# Patient Record
Sex: FEMALE | Race: OTHER | HISPANIC OR LATINO | Employment: FULL TIME | URBAN - METROPOLITAN AREA
[De-identification: names, ages, dates, MRNs, and addresses within clinical notes are randomized per-mention and may not be internally consistent; named-entity substitution may affect disease eponyms.]

---

## 2019-09-24 ENCOUNTER — HOSPITAL ENCOUNTER (OUTPATIENT)
Dept: ULTRASOUND IMAGING | Age: 34
Discharge: HOME | End: 2019-09-24
Attending: FAMILY MEDICINE
Payer: COMMERCIAL

## 2019-09-24 DIAGNOSIS — R10.9 FLANK PAIN: ICD-10-CM

## 2019-09-24 PROCEDURE — 76700 US EXAM ABDOM COMPLETE: CPT

## 2019-11-11 NOTE — PROGRESS NOTES
Interpreting services have been requested for procedure on 11/12/19. [facility]. Contact phone number is [phone]. Jona Foote [facility] Chano Palumbo  Patient [email or web address] Services  p: [phone] / Vinicius Merrill Do [facility] [address] / [place], [address] [facility]  [email or web address]. [facility]

## 2019-11-12 ENCOUNTER — HOSPITAL ENCOUNTER (OUTPATIENT)
Age: 34
Setting detail: OUTPATIENT SURGERY
Discharge: HOME | End: 2019-11-12
Attending: UROLOGY | Admitting: UROLOGY
Payer: COMMERCIAL

## 2019-11-12 ENCOUNTER — ANESTHESIA EVENT (OUTPATIENT)
Dept: SURGERY | Age: 34
End: 2019-11-12
Payer: COMMERCIAL

## 2019-11-12 ENCOUNTER — ANESTHESIA (OUTPATIENT)
Dept: SURGERY | Age: 34
End: 2019-11-12
Payer: COMMERCIAL

## 2019-11-12 VITALS
OXYGEN SATURATION: 96 % | WEIGHT: 140 LBS | HEART RATE: 106 BPM | DIASTOLIC BLOOD PRESSURE: 75 MMHG | TEMPERATURE: 98 F | BODY MASS INDEX: 26.45 KG/M2 | RESPIRATION RATE: 18 BRPM | SYSTOLIC BLOOD PRESSURE: 115 MMHG

## 2019-11-12 DIAGNOSIS — N20.0 RIGHT KIDNEY STONE: Primary | ICD-10-CM

## 2019-11-12 LAB — HCG UR QL: NEGATIVE

## 2019-11-12 PROCEDURE — 81025 URINE PREGNANCY TEST: CPT

## 2019-11-12 PROCEDURE — 74011250636 HC RX REV CODE- 250/636: Performed by: REGISTERED NURSE

## 2019-11-12 PROCEDURE — 76210000063 HC OR PH I REC FIRST 0.5 HR: Performed by: UROLOGY

## 2019-11-12 PROCEDURE — 50590 FRAGMENTING OF KIDNEY STONE: CPT | Performed by: UROLOGY

## 2019-11-12 PROCEDURE — 77030010509 HC AIRWY LMA MSK TELE -A: Performed by: ANESTHESIOLOGY

## 2019-11-12 PROCEDURE — 76060000033 HC ANESTHESIA 1 TO 1.5 HR: Performed by: UROLOGY

## 2019-11-12 PROCEDURE — 74011000250 HC RX REV CODE- 250: Performed by: REGISTERED NURSE

## 2019-11-12 PROCEDURE — 74011250636 HC RX REV CODE- 250/636: Performed by: ANESTHESIOLOGY

## 2019-11-12 PROCEDURE — 74011250637 HC RX REV CODE- 250/637: Performed by: ANESTHESIOLOGY

## 2019-11-12 PROCEDURE — 77030040361 HC SLV COMPR DVT MDII -B: Performed by: UROLOGY

## 2019-11-12 PROCEDURE — 76010000138 HC OR TIME 0.5 TO 1 HR: Performed by: UROLOGY

## 2019-11-12 PROCEDURE — 74011250636 HC RX REV CODE- 250/636: Performed by: UROLOGY

## 2019-11-12 PROCEDURE — 76210000020 HC REC RM PH II FIRST 0.5 HR: Performed by: UROLOGY

## 2019-11-12 RX ORDER — ONDANSETRON 2 MG/ML
INJECTION INTRAMUSCULAR; INTRAVENOUS AS NEEDED
Status: DISCONTINUED | OUTPATIENT
Start: 2019-11-12 | End: 2019-11-12 | Stop reason: HOSPADM

## 2019-11-12 RX ORDER — FENTANYL CITRATE 50 UG/ML
INJECTION, SOLUTION INTRAMUSCULAR; INTRAVENOUS AS NEEDED
Status: DISCONTINUED | OUTPATIENT
Start: 2019-11-12 | End: 2019-11-12 | Stop reason: HOSPADM

## 2019-11-12 RX ORDER — SODIUM CHLORIDE 0.9 % (FLUSH) 0.9 %
5-40 SYRINGE (ML) INJECTION EVERY 8 HOURS
Status: DISCONTINUED | OUTPATIENT
Start: 2019-11-12 | End: 2019-11-12 | Stop reason: HOSPADM

## 2019-11-12 RX ORDER — OXYCODONE HYDROCHLORIDE 5 MG/1
5 TABLET ORAL
Status: DISCONTINUED | OUTPATIENT
Start: 2019-11-12 | End: 2019-11-12 | Stop reason: HOSPADM

## 2019-11-12 RX ORDER — MIDAZOLAM HYDROCHLORIDE 1 MG/ML
2 INJECTION, SOLUTION INTRAMUSCULAR; INTRAVENOUS
Status: COMPLETED | OUTPATIENT
Start: 2019-11-12 | End: 2019-11-12

## 2019-11-12 RX ORDER — SODIUM CHLORIDE 0.9 % (FLUSH) 0.9 %
5-40 SYRINGE (ML) INJECTION AS NEEDED
Status: DISCONTINUED | OUTPATIENT
Start: 2019-11-12 | End: 2019-11-12 | Stop reason: HOSPADM

## 2019-11-12 RX ORDER — HYDROCODONE BITARTRATE AND ACETAMINOPHEN 5; 325 MG/1; MG/1
1 TABLET ORAL
Qty: 20 TAB | Refills: 0 | Status: SHIPPED | OUTPATIENT
Start: 2019-11-12 | End: 2019-11-19

## 2019-11-12 RX ORDER — SODIUM CHLORIDE, SODIUM LACTATE, POTASSIUM CHLORIDE, CALCIUM CHLORIDE 600; 310; 30; 20 MG/100ML; MG/100ML; MG/100ML; MG/100ML
75 INJECTION, SOLUTION INTRAVENOUS CONTINUOUS
Status: DISCONTINUED | OUTPATIENT
Start: 2019-11-12 | End: 2019-11-12 | Stop reason: HOSPADM

## 2019-11-12 RX ORDER — EPHEDRINE SULFATE/0.9% NACL/PF 50 MG/5 ML
SYRINGE (ML) INTRAVENOUS AS NEEDED
Status: DISCONTINUED | OUTPATIENT
Start: 2019-11-12 | End: 2019-11-12 | Stop reason: HOSPADM

## 2019-11-12 RX ORDER — CEFAZOLIN SODIUM/WATER 2 G/20 ML
2 SYRINGE (ML) INTRAVENOUS
Status: COMPLETED | OUTPATIENT
Start: 2019-11-12 | End: 2019-11-12

## 2019-11-12 RX ORDER — ACETAMINOPHEN 325 MG/1
325 TABLET ORAL ONCE
Status: COMPLETED | OUTPATIENT
Start: 2019-11-12 | End: 2019-11-12

## 2019-11-12 RX ORDER — DEXAMETHASONE SODIUM PHOSPHATE 4 MG/ML
INJECTION, SOLUTION INTRA-ARTICULAR; INTRALESIONAL; INTRAMUSCULAR; INTRAVENOUS; SOFT TISSUE AS NEEDED
Status: DISCONTINUED | OUTPATIENT
Start: 2019-11-12 | End: 2019-11-12 | Stop reason: HOSPADM

## 2019-11-12 RX ORDER — TAMSULOSIN HYDROCHLORIDE 0.4 MG/1
0.4 CAPSULE ORAL DAILY
Qty: 30 CAP | Refills: 1 | Status: SHIPPED | OUTPATIENT
Start: 2019-11-12 | End: 2021-01-26

## 2019-11-12 RX ORDER — PROPOFOL 10 MG/ML
INJECTION, EMULSION INTRAVENOUS AS NEEDED
Status: DISCONTINUED | OUTPATIENT
Start: 2019-11-12 | End: 2019-11-12 | Stop reason: HOSPADM

## 2019-11-12 RX ORDER — GLYCOPYRROLATE 0.2 MG/ML
INJECTION INTRAMUSCULAR; INTRAVENOUS AS NEEDED
Status: DISCONTINUED | OUTPATIENT
Start: 2019-11-12 | End: 2019-11-12 | Stop reason: HOSPADM

## 2019-11-12 RX ORDER — HYDROMORPHONE HYDROCHLORIDE 2 MG/ML
0.5 INJECTION, SOLUTION INTRAMUSCULAR; INTRAVENOUS; SUBCUTANEOUS
Status: DISCONTINUED | OUTPATIENT
Start: 2019-11-12 | End: 2019-11-12 | Stop reason: HOSPADM

## 2019-11-12 RX ORDER — OXYCODONE AND ACETAMINOPHEN 10; 325 MG/1; MG/1
1 TABLET ORAL AS NEEDED
Status: DISCONTINUED | OUTPATIENT
Start: 2019-11-12 | End: 2019-11-12 | Stop reason: HOSPADM

## 2019-11-12 RX ORDER — LIDOCAINE HYDROCHLORIDE 20 MG/ML
INJECTION, SOLUTION EPIDURAL; INFILTRATION; INTRACAUDAL; PERINEURAL AS NEEDED
Status: DISCONTINUED | OUTPATIENT
Start: 2019-11-12 | End: 2019-11-12 | Stop reason: HOSPADM

## 2019-11-12 RX ADMIN — Medication 5 MG: at 11:26

## 2019-11-12 RX ADMIN — Medication 5 MG: at 11:13

## 2019-11-12 RX ADMIN — MIDAZOLAM 2 MG: 1 INJECTION INTRAMUSCULAR; INTRAVENOUS at 10:01

## 2019-11-12 RX ADMIN — ACETAMINOPHEN 325 MG: 325 TABLET ORAL at 12:20

## 2019-11-12 RX ADMIN — ONDANSETRON 4 MG: 2 INJECTION INTRAMUSCULAR; INTRAVENOUS at 11:21

## 2019-11-12 RX ADMIN — PROPOFOL 150 MG: 10 INJECTION, EMULSION INTRAVENOUS at 11:06

## 2019-11-12 RX ADMIN — FENTANYL CITRATE 50 MCG: 50 INJECTION INTRAMUSCULAR; INTRAVENOUS at 11:03

## 2019-11-12 RX ADMIN — GLYCOPYRROLATE 0.1 MG: 0.2 INJECTION, SOLUTION INTRAMUSCULAR; INTRAVENOUS at 11:24

## 2019-11-12 RX ADMIN — FENTANYL CITRATE 50 MCG: 50 INJECTION INTRAMUSCULAR; INTRAVENOUS at 11:05

## 2019-11-12 RX ADMIN — SODIUM CHLORIDE, SODIUM LACTATE, POTASSIUM CHLORIDE, AND CALCIUM CHLORIDE 75 ML/HR: 600; 310; 30; 20 INJECTION, SOLUTION INTRAVENOUS at 09:23

## 2019-11-12 RX ADMIN — Medication 2 G: at 11:05

## 2019-11-12 RX ADMIN — SODIUM CHLORIDE, SODIUM LACTATE, POTASSIUM CHLORIDE, AND CALCIUM CHLORIDE: 600; 310; 30; 20 INJECTION, SOLUTION INTRAVENOUS at 11:34

## 2019-11-12 RX ADMIN — LIDOCAINE HYDROCHLORIDE 100 MG: 20 INJECTION, SOLUTION EPIDURAL; INFILTRATION; INTRACAUDAL; PERINEURAL at 11:06

## 2019-11-12 RX ADMIN — DEXAMETHASONE SODIUM PHOSPHATE 4 MG: 4 INJECTION, SOLUTION INTRAMUSCULAR; INTRAVENOUS at 11:21

## 2019-11-12 NOTE — OP NOTES
Operative Note                Patient: Fabby Owens, [id number]    Date of Surgery: 11/12/19    Preoperative Diagnosis: Right Kidney Stone    Postoperative Diagnosis:  same    Surgeon(s) and Role:     * Douglas Delgado M.D. - Primary     Anesthesia:  General     Procedure: Procedure(s):  RIGHT EXTRACORPORAL SHOCKWAVE LITHOTRIPSY (ESWL)     Indications:     Discussed the risk of surgery including infection, hematoma, bleeding, recurrence or persistence of symptoms or stone, and the risks of general anesthetic. The patient understands the risks, any and all questions were answered to the patient's satisfaction and they freely signed the consent for operation. Procedure in Detail:  Patient was taken to the lithotripsy suite. Anesthesia was induced via the anesthesiology service. Using flouroscopy for guidance, a total of 3000 shocks were delivered. We began in a non-gaited fashion. Minor cardiac ectopy was experienced at 500 shocks. The remainder of the case was then completed in a gaited fashion with no further cardiac ectopy experienced. Shock rate was begun at 60 shocks per minute. Energy level was begun at 7 and gradually increased to a maximum of 11. Findings: Patient tolerated the procedure well. At the end of the procedure, the stone appeared to have fractured. Estimated Blood Loss:  none    Specimens: * No specimens in log *             Drains: none                 Implants: * No implants in log *    Douglas Delgado M.D.     [facility]  [address] [place] [address], [facility] [address]  Phone: [phone]  Fax: [phone]

## 2019-11-12 NOTE — DISCHARGE INSTRUCTIONS
Patient Education        Lithotripsy: What to Expect at Home  Your Recovery    Lithotripsy is a way to treat kidney stones without surgery. It is also called extracorporeal shock wave lithotripsy, or ESWL. This treatment uses sound waves to break kidney stones into tiny pieces. These pieces can then pass out of the body in the urine. You may have a small amount of blood in your urine after this treatment. Your urine may be slightly pink or reddish. The blood in the urine often goes away after 2 days. You may have a plastic tube inside one of your ureters. Ureters are the tubes that connect the kidneys to the bladder. The plastic tube is called a stent. It takes urine from your kidney to your bladder. This lets the stone pass more easily. Your doctor may remove the stent in about a week or two. This care sheet gives you a general idea about how long it will take for you to recover. But each person recovers at a different pace. Follow the steps below to feel better as quickly as possible. How can you care for yourself at home? Activity    · Rest as much as you need to after you go home.     · You may do your regular activities. But avoid hard exercise or sports for a week. Wait until there is no blood in your urine and the stent is out. Diet    · You can eat your normal diet.     · Drink plenty of fluids, enough so that your urine is light yellow or clear like water. If you have kidney, heart, or liver disease and have to limit fluids, talk with your doctor before you increase the amount of fluids you drink. Medicines    · Your doctor will tell you if and when you can restart your medicines. He or she will also give you instructions about taking any new medicines.     · If you take blood thinners, such as warfarin (Coumadin), clopidogrel (Plavix), or aspirin, be sure to talk to your doctor. He or she will tell you if and when to start taking those medicines again.  Make sure that you understand exactly what your doctor wants you to do.     · Be safe with medicines. Read and follow all instructions on the label. ? If the doctor gave you a prescription medicine for pain, take it as prescribed. ? If you are not taking a prescription pain medicine, ask your doctor if you can take acetaminophen (Tylenol). Do not take ibuprofen (Advil, Motrin) or naproxen (Aleve), or similar medicines unless your doctor tells you to. ? Do not take two or more pain medicines at the same time unless the doctor told you to. Many pain medicines have acetaminophen, which is Tylenol. Too much acetaminophen (Tylenol) can be harmful. Other instructions    · Urinate through the strainer the doctor gives you. Save any stone pieces, including those that look like sand or gravel. Take these to your doctor. This will help your doctor find the cause of your stones. Follow-up care is a key part of your treatment and safety. Be sure to make and go to all appointments, and call your doctor if you are having problems. It's also a good idea to know your test results and keep a list of the medicines you take. When should you call for help? Call 911 anytime you think you may need emergency care. For example, call if:    · You passed out (lost consciousness).     · You have chest pain, are short of breath, or cough up blood.    Call your doctor now or seek immediate medical care if:    · You have pain that does not get better after you take pain medicine.     · You have new or more blood clots in your urine. (It is normal for the urine to be pink for a few days.)     · You cannot urinate.     · You have symptoms of a urinary tract infection. These may include:  ? Pain or burning when you urinate. ? A frequent need to urinate without being able to pass much urine. ? Pain in the flank, which is just below the rib cage and above the waist on either side of the back. ? Blood in the urine.   ? A fever.     · You are sick to your stomach or cannot drink fluids.     · You have signs of a blood clot in your leg (called a deep vein thrombosis), such as:  ? Pain in the calf, back of the knee, thigh, or groin. ? Redness and swelling in your leg.    Watch closely for any changes in your health, and be sure to contact your doctor if you have any problems. Where can you learn more? Go to http://jonathan-hermann.info/. Enter W902 in the search box to learn more about \"Lithotripsy: What to Expect at Home. \"  Current as of: October 31, 2018  Content Version: 12.2  © 9055-9447 [employer]. Care instructions adapted under license by [employer] (which disclaims liability or warranty for this information). If you have questions about a medical condition or this instruction, always ask your healthcare professional. [address] any warranty or liability for your use of this information. TYPICAL SIDE EFFECTS OF PAIN MEDICATION:  *    Constipation: Drink lots of fluids. Over the counter stool softener if needed. *    Nausea: Take pain medication with food. Call your doctor with persistent nausea. ACTIVITY  · As tolerated and as directed by your doctor. · Bathe or shower as directed by your doctor. DIET  · Day of surgery: Clear liquids until no nausea or vomiting; small portion, light diet Amagon foods (ex: baked chicken, plain rice, grits, scrambled eggs, toast). Nothing greasy, fried or spicy today. · Advance to regular diet on second day, unless your doctor orders otherwise. · If nausea and vomiting continues, call your doctor. PAIN  · Take pain medication as directed by your doctor. · DO NOT take aspirin or blood thinners unless directed by your doctor.        CALL YOUR DOCTOR IF    s Call your doctor if pain is NOT relieved by medication.   s Excessive bleeding that does not stop after holding pressure over the area  · Temperature of 101 degrees F or above  · Excessive redness, swelling or bruising, and/ or green or yellow, smelly discharge from incision    AFTER ANESTHESIA   · For the first 24 hours: DO NOT Drive, Drink alcoholic beverages, or Make important decisions. · Be aware of dizziness following anesthesia and while taking pain medication. DISCHARGE SUMMARY from Nurse    PATIENT INSTRUCTIONS:    After general anesthesia or intravenous sedation, for 24 hours or while taking prescription Narcotics:  · Limit your activities  · Do not drive and operate hazardous machinery  · Do not make important personal or business decisions  · Do  not drink alcoholic beverages  · If you have not urinated within 8 hours after discharge, please contact your surgeon on call. *  Please give a list of your current medications to your Primary Care Provider. *  Please update this list whenever your medications are discontinued, doses are      changed, or new medications (including over-the-counter products) are added. *  Please carry medication information at all times in case of emergency situations. Preventing Infection at Home  We care about preventing infection and avoiding the spread of germs - not only when you are in the hospital but also when you return home. When you return home from the hospital, its important to take the following steps to help prevent infection and avoid spreading germs that could infect you and others. Ask everyone in your home to follow these guidelines, too. Clean Your Hands  · Clean your hands whenever your hands are visibly dirty, before you eat, before or after touching your mouth, nose or eyes, and before preparing food. Clean them after contact with body fluids, using the restroom, touching animals or changing diapers. · When washing hands, wet them with warm water and work up a lather. Rub hands for at least 15 seconds, then rinse them and pat them dry with a clean towel or paper towel.   · When using hand sanitizers, it should take about 15 seconds to rub your hands dry. If not, you probably didnt apply enough . Cover Your Sneeze or Cough  Germs are released into the air whenever you sneeze or cough. To prevent the spread of infection:  · Turn away from other people before coughing or sneezing. · Cover your mouth or nose with a tissue when you cough or sneeze. Put the tissue in the trash. · If you dont have a tissue, cough or sneeze into your upper sleeve, not your hands. · Always clean your hands after coughing or sneezing. Care for Wounds  Your skin is your bodys first line of defense against germs, but an open wound leaves an easy way for germs to enter your body. To prevent infection:  · Clean your hands before and after changing wound dressings, and wear gloves to change dressings if recommended by your doctor. · Take special care with IV lines or other devices inserted into the body. If you must touch them, clean your hands first.  · Follow any specific instructions from your doctor to care for your wounds. Contact your doctor if you experience any signs of infection, such as fever or increased redness at the surgical or wound site. Keep a Clean Home  · Clean or wipe commonly touched hard surfaces like door handles, sinks, tabletops, phones and TV remotes. · Use products labeled disinfectant to kill harmful bacteria and viruses. · Use a clean cloth or paper towel to clean and dry surfaces. Wiping surfaces with a dirty dishcloth, sponge or towel will only spread germs. · Never share toothbrushes, izaguirre, drinking glasses, utensils, razor blades, face cloths or bath towels to avoid spreading germs. · Be sure that the linens that you sleep on are clean. · Keep pets away from wounds and wash your hands after touching pets, their toys or bedding. We care about you and your health. Remember, preventing infections is a team effort between you, your family, friends and health care providers.        These are general instructions for a healthy lifestyle:    No smoking/ No tobacco products/ Avoid exposure to second hand smoke    Surgeon General's Warning:  Quitting smoking now greatly reduces serious risk to your health. Obesity, smoking, and sedentary lifestyle greatly increases your risk for illness    A healthy diet, regular physical exercise & weight monitoring are important for maintaining a healthy lifestyle    You may be retaining fluid if you have a history of heart failure or if you experience any of the following symptoms:  Weight gain of 3 pounds or more overnight or 5 pounds in a week, increased swelling in our hands or feet or shortness of breath while lying flat in bed. Please call your doctor as soon as you notice any of these symptoms; do not wait until your next office visit. Recognize signs and symptoms of STROKE:    F-face looks uneven    A-arms unable to move or move unevenly    S-speech slurred or non-existent    T-time-call 911 as soon as signs and symptoms begin-DO NOT go       Back to bed or wait to see if you get better-TIME IS BRAIN.

## 2019-11-12 NOTE — PROGRESS NOTES
present at bedside in OP Pre-op area during information verification with unit nurse and signature of consents. Gabbie Grimaldo [facility] Garcia  Patient [email or web address]  p: [phone] / Vinicius Merrill Do [facility] [address] / [place], [address] [facility]  [email or web address]

## 2019-11-12 NOTE — PERIOP NOTES
Preoperative flank skin condition: warm, dry, intact  Lead shield: Yes  Patient ear protection: Yes   Gel applied to patient's flank and Lithotripsy head: Yes   Starting power level: 3  Shock start time (first  fluoroscopy): 1102  Shock stop time (last lithotripsy shock): 1149  Ending power level: 11  Total shocks: 3000  Total fluoroscopy time: 1 min 47 sec  Postoperative flank skin condition: warm, dry, intact

## 2019-11-12 NOTE — H&P
H&P Update:  Collette Rocher was seen and examined. History and physical has been reviewed. The patient has been examined. There have been no significant clinical changes since the completion of the originally dated History and Physical.    Audrey Garrison  : 1985         Chief Complaint   Patient presents with    New Patient   [address] [facility]      HPI      Audrey Garrison is a 36 y.o.  female who is referred to clinic Dr. Huber White. [address] [facility] for evaluation of a 7 mm R kidney stone. H&P today conducted with the assistance of a .      She reports a recent history of intermittent R flank pain and nausea. Renal US in 2019 showed a 7 mm R non-obstructing kidney stone. No hydronephrosis. She has never had stone surgery but has had kidney stones before and has passed them all spontaneously. I personally reviewed US results today and agree with the radiology assessment.      No fevers/chills. No hematuria. No urgency, frequency, incontinence. She does bring a recently passed stone in with her today for analysis.       KUB today shows visible R kidney stone measuring 7 mm in size.             Past Medical History:   Diagnosis Date    Kidney stone        History reviewed. No pertinent surgical history.             Current Outpatient Medications   Medication Sig Dispense Refill    norgestimate-ethinyl estradiol (ORTHO-CYCLEN, SPRINTEC) 0.25-35 mg-mcg tab Take 1 Tab by mouth.        HYDROcodone-acetaminophen (NORCO)  mg tablet Take 1 Tab by mouth every eight (8) hours as needed for Pain for up to 10 days. Max Daily Amount: 3 Tabs. Indications: pain 30 Tab 0    escitalopram oxalate (LEXAPRO) 10 mg tablet Take 1 Tab by mouth daily.  30 Tab 11           Allergies   Allergen Reactions    Aspirin Swelling      Social History            Socioeconomic History    Marital status: SINGLE       Spouse name: Not on file    Number of children: Not on file    Years of education: Not on file    Highest education level: Not on file   Occupational History    Not on file   Social Needs    Financial resource strain: Not on file    Food insecurity:       Worry: Not on file       Inability: Not on file    Transportation needs:       Medical: Not on file       Non-medical: Not on file   Tobacco Use    Smoking status: Never Smoker    Smokeless tobacco: Never Used   Substance and Sexual Activity    Alcohol use: Not Currently    Drug use: Never    Sexual activity: Not on file   Lifestyle    Physical activity:       Days per week: Not on file       Minutes per session: Not on file    Stress: Not on file   Relationships    Social connections:       Talks on phone: Not on file       Gets together: Not on file       Attends Yarsanism service: Not on file       Active member of club or organization: Not on file       Attends meetings of clubs or organizations: Not on file       Relationship status: Not on file    Intimate partner violence:       Fear of current or ex partner: Not on file       Emotionally abused: Not on file       Physically abused: Not on file       Forced sexual activity: Not on file   Other Topics Concern    Not on file   Social History Narrative    Not on file            Family History   Problem Relation Age of Onset    Diabetes Mother      Hypertension Mother      Heart Disease Mother      Asthma Father      Diabetes Father      Heart Disease Father      Hypertension Father      Thyroid Disease Father      Hypertension Paternal Grandmother           Review of Systems  Constitutional:   Negative for fever, chills, appetite change, malaise/fatigue, headaches and weight loss. Skin:  Negative for skin lesions, rash and itching. Eyes:  Negative for visual disturbance, eye pain and eye discharge. ENT:  Negative for difficulty articulating words, pain swallowing, high frequency hearing loss and dry mouth.   Respiratory:  Negative for cough, blood in sputum, shortness of breath and wheezing. Cardiovascular:  Negative for chest pain, hypertension, irregular heartbeat, leg pain, leg swelling, regular rate and rhythm and varicose veins. GI:  Negative for nausea, vomiting, abdominal pain, blood in stool, constipation, diarrhea, indigestion and heartburn. Genitourinary:  Negative for urinary burning, hematuria, flank pain, recurrent UTIs, history of urolithiasis, nocturia, slower stream, straining, urgency, leakage w/ urge, frequent urination, incomplete emptying, sexually transmitted disease, menstrual problem, endometriosis, vaginal pain and hysterectomy. Musculoskeletal:  Negative for back pain, bone pain, arthralgias, tenderness, muscle weakness and neck pain. Neurological:  Negative for dizziness, focal weakness, numbness, seizures and tremors. Psychological:  Negative for depression and psychiatric problem. Endocrine:  Negative for cold intolerance, thirst, excessive urination, fatigue and heat intolerance.   Hem/Lymphatic:  Negative for easy bleeding, easy bruising and frequent infections.        Urinalysis  UA - Dipstick         Results for orders placed or performed in visit on 11/04/19   AMB POC URINALYSIS DIP STICK AUTO W/O MICRO (PGU)     Status: None   Result Value Ref Range Status     Glucose (UA POC) Negative Negative mg/dL Final     Bilirubin (UA POC) Negative Negative Final     Ketones (UA POC) Negative Negative Final     Specific gravity (UA POC) 1.030 1.001 - 1.035 Final     Blood (UA POC) Trace Negative Final     pH (UA POC) 5.5 4.6 - 8.0 Final     Protein (UA POC) Negative Negative Final     Urobilinogen (POC) 0.2 mg/dL   Final     Nitrites (UA POC) Negative Negative Final     Leukocyte esterase (UA POC) Negative Negative Final         Visit Vitals  /69   Pulse 83   Wt 141 lb (64 kg)   BMI 26.64 kg/m²         GENERAL: No acute distress, Awake, Alert, Oriented X 3, Gait normal  CARDIAC: regular rate and rhythm  CHEST AND LUNG: Easy work of breathing, clear to auscultation bilaterally, no cyanosis  ABDOMEN: soft, non tender, non-distended, positive bowel sounds, no organomegaly, no palpable masses, no guarding, no rebound tenderness  BACK: No CVA TTP  SKIN: No rash, no erythema, no lacerations or abrasions, no ecchymosis  NEUROLOGIC: cranial nerves 2-12 grossly intact      Renal US: 9/24/19   IMPRESSION:   1. 7 mm right renal stone. No hydronephrosis is seen of the right kidney to suggest obstruction. If clinical symptoms suggest a potential obstructing stone, then a noncontrast stone hunt CT scan of the abdomen and pelvis can be  considered. No potential etiology for right flank pain is otherwise seen.        Assessment and Plan      ICD-10-CM ICD-9-CM     1. Kidney stone N20.0 592.0 AMB POC URINALYSIS DIP STICK AUTO W/O MICRO (PGU)         AMB POC XRAY ABDOMEN 1 VIEW         STONE ANALYSIS, URINARY      Kidney Stone:   I reviewed the risks/benefits/alternatives for stone treatment today in detail with the patient. Treatment options discussed include medical expulsive therapy (MET) vs. ESWL vs. Ureteroscopy/laser lithotropsy vs. PCNL.       After our discussion, the patient would like to proceed with RIGHT ESWL. Risks of ESWL that we discussed were bleeding, infection, flank pain, bruising, renal hematoma, injury to surrounding structures, incomplete fragmentation of stones, steinstrasse, inability to pass stone fragments requiring additional operative intervention in the form of ureteroscopy/laser lithotripsy and/or stent placement, renal failure, hypertension, risks of general anesthesia, recurrent stones. The patient expressed understanding of the above.       >50% of visit spent counseling patient on the above.     Douglas Watts M.D.  [facility]  [place] [address] [address] [facility]  Phone: [phone]  Fax: [phone]

## 2019-11-12 NOTE — ANESTHESIA POSTPROCEDURE EVALUATION
Procedure(s):  RIGHT LITHOTRIPSY EXTRACORPOREAL SHOCKWAVE ESWL .     general    Anesthesia Post Evaluation      Multimodal analgesia: multimodal analgesia used between 6 hours prior to anesthesia start to PACU discharge  Patient location during evaluation: PACU  Patient participation: complete - patient participated  Level of consciousness: awake  Pain management: adequate  Airway patency: patent  Anesthetic complications: no  Cardiovascular status: acceptable  Respiratory status: acceptable  Hydration status: acceptable  Post anesthesia nausea and vomiting:  none      Vitals Value Taken Time   /78 11/12/2019 12:21 PM   Temp 36.7 °C (98 °F) 11/12/2019 12:16 PM   Pulse 98 11/12/2019 12:21 PM   Resp 18 11/12/2019 12:21 PM   SpO2 98 % 11/12/2019 12:21 PM

## 2019-11-12 NOTE — ANESTHESIA PREPROCEDURE EVALUATION
Relevant Problems   No relevant active problems       Anesthetic History   No history of anesthetic complications            Review of Systems / Medical History  Patient summary reviewed and pertinent labs reviewed    Pulmonary  Within defined limits                 Neuro/Psych   Within defined limits           Cardiovascular                  Exercise tolerance: >4 METS     GI/Hepatic/Renal         Renal disease: stones       Endo/Other  Within defined limits           Other Findings              Physical Exam    Airway  Mallampati: II  TM Distance: > 6 cm  Neck ROM: normal range of motion   Mouth opening: Normal     Cardiovascular    Rhythm: regular           Dental  No notable dental hx       Pulmonary                 Abdominal  GI exam deferred       Other Findings            Anesthetic Plan    ASA: 2  Anesthesia type: general          Induction: Intravenous  Anesthetic plan and risks discussed with: Patient

## 2021-09-19 ENCOUNTER — HOSPITAL ENCOUNTER (EMERGENCY)
Age: 36
Discharge: HOME | End: 2021-09-19
Attending: EMERGENCY MEDICINE
Payer: COMMERCIAL

## 2021-09-19 ENCOUNTER — APPOINTMENT (OUTPATIENT)
Dept: GENERAL RADIOLOGY | Age: 36
End: 2021-09-19
Attending: EMERGENCY MEDICINE
Payer: COMMERCIAL

## 2021-09-19 VITALS
DIASTOLIC BLOOD PRESSURE: 86 MMHG | WEIGHT: 150 LBS | TEMPERATURE: 97.7 F | BODY MASS INDEX: 28.32 KG/M2 | SYSTOLIC BLOOD PRESSURE: 126 MMHG | OXYGEN SATURATION: 100 % | HEART RATE: 66 BPM | HEIGHT: 61 IN | RESPIRATION RATE: 18 BRPM

## 2021-09-19 DIAGNOSIS — R00.2 PALPITATIONS: Primary | ICD-10-CM

## 2021-09-19 PROCEDURE — 71046 X-RAY EXAM CHEST 2 VIEWS: CPT

## 2021-09-19 PROCEDURE — 99284 EMERGENCY DEPT VISIT MOD MDM: CPT

## 2021-09-19 PROCEDURE — 93005 ELECTROCARDIOGRAM TRACING: CPT | Performed by: NURSE PRACTITIONER

## 2021-09-19 PROCEDURE — 74011250636 HC RX REV CODE- 250/636: Performed by: EMERGENCY MEDICINE

## 2021-09-19 RX ORDER — DEXAMETHASONE 4 MG/1
6 TABLET ORAL EVERY 12 HOURS
Status: DISCONTINUED | OUTPATIENT
Start: 2021-09-19 | End: 2021-09-19 | Stop reason: HOSPADM

## 2021-09-19 NOTE — DISCHARGE INSTRUCTIONS
Continue to stay well-hydrated. If your symptoms persist you may need to follow-up with cardiology outpatient for a Holter monitor study. You will need to call the cardiologist in order to schedule this.

## 2021-09-19 NOTE — ED TRIAGE NOTES
Pt ambulatory to triage. Pt states tested positive for COVID on Monday, has proof on phone. Pt states S/SX x 11 days total. Pt states she feels like her heart is racing and has palpitations. . Denies cp, SOB, cough. Pt is on BC.  Denies tobacco.

## 2021-09-19 NOTE — ED NOTES
I have reviewed discharge instructions with the patient. The patient verbalized understanding. Patient left ED via Discharge Method: ambulatory to Home with Brother   Opportunity for questions and clarification provided. Patient given 0 scripts. To continue your aftercare when you leave the hospital, you may receive an automated call from our care team to check in on how you are doing. This is a free service and part of our promise to provide the best care and service to meet your aftercare needs.  If you have questions, or wish to unsubscribe from this service please call [phone]. Thank you for Choosing our [facility] Emergency Department.

## 2021-09-19 NOTE — ED PROVIDER NOTES
Patient is a 29-year-old female presenting to the emergency department today complaining of palpitations which have been on and off intermittently since she was diagnosed with Covid 11 days ago. She denies any fevers or chills or shortness of breath. Patient said when she is active the heart rate will get faster. The patient denies any chest pain. Past Medical History:   Diagnosis Date    Kidney stone 11/2019    2nd one at present       Past Surgical History:   Procedure Laterality Date    HX BREAST REDUCTION Bilateral 2017    HX DILATION AND CURETTAGE  2014    [address]X [address]  as infant         Family History:   Problem Relation Age of Onset    Diabetes Mother     Hypertension Mother     Heart Disease Mother     Asthma Father     Diabetes Father     Hypertension Father     Thyroid Disease Father     Hypertension Paternal Grandmother        Social History     Socioeconomic History    Marital status: SINGLE     Spouse name: Not on file    Number of children: Not on file    Years of education: Not on file    Highest education level: Not on file   Occupational History    Not on file   Tobacco Use    Smoking status: Never Smoker    Smokeless tobacco: Never Used   Vaping Use    Vaping Use: Never used   Substance and Sexual Activity    Alcohol use: Never    Drug use: Never    Sexual activity: Not on file   Other Topics Concern    Not on file   Social History Narrative    Not on file     Social Determinants of Health     Financial Resource Strain:     Difficulty of Paying Living Expenses:    Food Insecurity:     Worried About Running Out of Food in the Last Year:     [address] in the Last Year:    Transportation Needs:     Lack of Transportation (Medical):      Lack of Transportation (Non-Medical):    Physical Activity:     Days of Exercise per Week:     Minutes of Exercise per Session:    Stress:     Feeling of Stress :    Social Connections:     Frequency of Communication with Friends and Family:     Frequency of Social Gatherings with Friends and Family:     Attends Confucianist Services:     Active Member of Clubs or Organizations:     Attends Club or Organization Meetings:     Marital Status:    Intimate Partner Violence:     Fear of Current or Ex-Partner:     Emotionally Abused:     Physically Abused:     Sexually Abused: ALLERGIES: Aspirin    Review of Systems   Cardiovascular: Positive for palpitations. All other systems reviewed and are negative. Vitals:    09/19/21 1317 09/19/21 1429 09/19/21 1432   BP: (!) 144/97 130/78 130/79   Pulse: 80 72 72   Resp: 16     Temp: 97.7 °F (36.5 °C)     SpO2: 98% 99% 99%   Weight: 68 kg (150 lb)     Height: 5' 1\" (1.549 m)              Physical Exam     GENERAL:The patient has Body mass index is 28.34 kg/m². Well-hydrated. VITAL SIGNS: Heart rate, blood pressure, respiratory rate reviewed as recorded in  nurse's notes  EYES: Pupils reactive. Extraocular motion intact. No conjunctival redness or drainage. NECK: Supple, no meningeal signs. Trachea midline. No masses or thyromegaly. LUNGS: Breath sounds clear and equal bilaterally no accessory muscle use. CHEST: No deformity  CARDIOVASCULAR: Regular rate and rhythm  EXTREMITIES: No clubbing or cyanosis. No joint swelling. Normal muscle tone. No  restricted range of motion appreciated. NEUROLOGIC: Sensation is grossly intact. Cranial nerve exam reveals face is  symmetrical, tongue is midline speech is clear. SKIN: No rash or petechiae. Good skin turgor palpated. PSYCHIATRIC: Alert and oriented. Appropriate behavior and judgment.       MDM  Number of Diagnoses or Management Options  Diagnosis management comments: acute exacerbation asthma, COPD, CHF, COVID-19    Bronchitis, aspiration pneumonia, pneumonia,    PE, rib fracture, rib dysfunction, pleurisy, pneumothorax, aspiration of foreign body         Amount and/or Complexity of Data Reviewed  Tests in the radiology section of CPT®: ordered and reviewed  Review and summarize past medical records: yes  Independent visualization of images, tracings, or specimens: yes           EKG    Date/Time: 9/19/2021 3:00 PM  Performed by: Jeny Ngo DO  Authorized by: Jeny Ngo DO     ECG reviewed by ED Physician in the absence of a cardiologist: yes    Comments:      Normal sinus rhythm and rate of 66 bpm with no ST elevations or depressions appreciated.

## 2021-09-20 LAB
ATRIAL RATE: 66 BPM
CALCULATED P AXIS, ECG09: 51 DEGREES
CALCULATED R AXIS, ECG10: 51 DEGREES
CALCULATED T AXIS, ECG11: 36 DEGREES
DIAGNOSIS, 93000: NORMAL
P-R INTERVAL, ECG05: 134 MS
Q-T INTERVAL, ECG07: 404 MS
QRS DURATION, ECG06: 74 MS
QTC CALCULATION (BEZET), ECG08: 423 MS
VENTRICULAR RATE, ECG03: 66 BPM

## 2021-12-21 ENCOUNTER — HOSPITAL ENCOUNTER (OUTPATIENT)
Dept: ULTRASOUND IMAGING | Age: 36
Discharge: HOME | End: 2021-12-21
Attending: FAMILY MEDICINE
Payer: COMMERCIAL

## 2021-12-21 DIAGNOSIS — R10.9 FLANK PAIN: ICD-10-CM

## 2021-12-21 PROCEDURE — 76705 ECHO EXAM OF ABDOMEN: CPT

## 2022-05-12 DIAGNOSIS — Z00.00 ROUTINE GENERAL MEDICAL EXAMINATION AT A HEALTH CARE FACILITY: Primary | ICD-10-CM

## 2022-10-11 ENCOUNTER — OFFICE VISIT (OUTPATIENT)
Dept: FAMILY MEDICINE CLINIC | Facility: CLINIC | Age: 37
End: 2022-10-11
Payer: COMMERCIAL

## 2022-10-11 VITALS
WEIGHT: 157 LBS | BODY MASS INDEX: 29.64 KG/M2 | HEIGHT: 61 IN | SYSTOLIC BLOOD PRESSURE: 120 MMHG | DIASTOLIC BLOOD PRESSURE: 80 MMHG

## 2022-10-11 DIAGNOSIS — M79.605 BILATERAL LEG PAIN: ICD-10-CM

## 2022-10-11 DIAGNOSIS — M79.604 BILATERAL LEG PAIN: ICD-10-CM

## 2022-10-11 DIAGNOSIS — N93.8 DUB (DYSFUNCTIONAL UTERINE BLEEDING): Primary | ICD-10-CM

## 2022-10-11 LAB
HCG QUALITATIVE, POC: NEGATIVE
HCG, PREGNANCY, URINE, POC: NEGATIVE
HCG, PREGNANCY, URINE, POC: NORMAL
VALID INTERNAL CONTROL, POC: YES
VALID INTERNAL CONTROL, POC: YES

## 2022-10-11 PROCEDURE — 99214 OFFICE O/P EST MOD 30 MIN: CPT | Performed by: FAMILY MEDICINE

## 2022-10-11 PROCEDURE — 84703 CHORIONIC GONADOTROPIN ASSAY: CPT | Performed by: FAMILY MEDICINE

## 2022-10-11 PROCEDURE — 81025 URINE PREGNANCY TEST: CPT | Performed by: FAMILY MEDICINE

## 2022-10-11 RX ORDER — NAPROXEN 500 MG/1
500 TABLET ORAL 2 TIMES DAILY PRN
Qty: 30 TABLET | Refills: 0 | Status: SHIPPED | OUTPATIENT
Start: 2022-10-11

## 2022-10-11 ASSESSMENT — ENCOUNTER SYMPTOMS
SORE THROAT: 0
VOICE CHANGE: 0
NAUSEA: 0
VOMITING: 0
RHINORRHEA: 0
COUGH: 0
SINUS PRESSURE: 0
WHEEZING: 0

## 2022-10-11 ASSESSMENT — PATIENT HEALTH QUESTIONNAIRE - PHQ9
SUM OF ALL RESPONSES TO PHQ QUESTIONS 1-9: 0
SUM OF ALL RESPONSES TO PHQ QUESTIONS 1-9: 0
SUM OF ALL RESPONSES TO PHQ9 QUESTIONS 1 & 2: 0
2. FEELING DOWN, DEPRESSED OR HOPELESS: 0
SUM OF ALL RESPONSES TO PHQ QUESTIONS 1-9: 0
1. LITTLE INTEREST OR PLEASURE IN DOING THINGS: 0
SUM OF ALL RESPONSES TO PHQ QUESTIONS 1-9: 0

## 2022-10-11 NOTE — PROGRESS NOTES
PROGRESS NOTE    SUBJECTIVE:   Dejah Bowen is a 40 y.o. female seen for a follow up visit regarding the following chief complaint:     Chief Complaint   Patient presents with    Pain     Bilateral leg pain  for the past  for the past 3 weeks patient use insert need a new Rx     Other     Nauseos , fatigue ,requesting a blood test for pregancy           HPI presents office complaining of bilateral leg pain below her knee has had issues with her feet in the past and is presently using orthotics patient also complaining of nausea fatigue and requesting a pregnancy test patient is trying to get pregnant seeing a specialist has been placed on multiple medications and they cannot get her in for another month patient is very anxious      Past Medical History, Past Surgical History, Family history, Social History, and Medications were all reviewed with the patient today and updated as necessary. Current Outpatient Medications   Medication Sig Dispense Refill    naproxen (NAPROSYN) 500 MG tablet Take 1 tablet by mouth 2 times daily as needed for Pain 30 tablet 0    cyanocobalamin 1000 MCG tablet Take 1,000 mcg by mouth daily       No current facility-administered medications for this visit. Allergies   Allergen Reactions    Sumatriptan Palpitations    Aspirin Swelling     There is no problem list on file for this patient.     Past Medical History:   Diagnosis Date    Kidney stone 11/2019    2nd one at present     Past Surgical History:   Procedure Laterality Date    BREAST REDUCTION SURGERY Bilateral 2017    DILATION AND CURETTAGE OF UTERUS  2014 [address]  as infant     Family History   Problem Relation Age of Onset    Thyroid Disease Father     Hypertension Paternal Grandmother     Hypertension Father     Diabetes Mother     Hypertension Mother     Heart Disease Mother     Asthma Father     Diabetes Father      Social History     Tobacco Use    Smoking status: Never    Smokeless tobacco: Never Substance Use Topics    Alcohol use: Never         Review of Systems   Constitutional:  Negative for chills and fever. HENT:  Negative for congestion, rhinorrhea, sinus pressure, sneezing, sore throat and voice change. Respiratory:  Negative for cough and wheezing. Cardiovascular:  Negative for chest pain. Gastrointestinal:  Negative for nausea and vomiting. Genitourinary:  Positive for menstrual problem and pelvic pain. Musculoskeletal:  Negative for arthralgias. Skin:  Negative for rash. Neurological:  Negative for headaches. Hematological:  Negative for adenopathy. OBJECTIVE:  /80 (Site: Right Upper Arm)   Ht 5' 1\" (1.549 m)   Wt 157 lb (71.2 kg)   BMI 29.66 kg/m²      Physical Exam  Vitals and nursing note reviewed. Constitutional:       Appearance: Normal appearance. HENT:      Head: Normocephalic and atraumatic. Right Ear: Tympanic membrane normal.      Left Ear: Tympanic membrane normal.      Nose: Nose normal.      Mouth/Throat:      Mouth: Mucous membranes are moist.   Eyes:      Conjunctiva/sclera: Conjunctivae normal.      Pupils: Pupils are equal, round, and reactive to light. Cardiovascular:      Rate and Rhythm: Normal rate and regular rhythm. Pulses: Normal pulses. Heart sounds: Normal heart sounds. Pulmonary:      Effort: Pulmonary effort is normal.      Breath sounds: Normal breath sounds. Abdominal:      General: Abdomen is flat. Bowel sounds are normal.      Palpations: Abdomen is soft. Musculoskeletal:         General: Normal range of motion. Cervical back: Normal range of motion and neck supple. Skin:     General: Skin is warm and dry. Neurological:      General: No focal deficit present. Mental Status: She is alert and oriented to person, place, and time. Psychiatric:         Behavior: Behavior normal.        Medical problems and test results were reviewed with the patient today.      Recent Results (from the past 672 hour(s))   AMB POC URINE PREGNANCY TEST, VISUAL COLOR COMPARISON    Collection Time: 10/11/22  9:07 AM   Result Value Ref Range    Valid Internal Control, POC yes     HCG, Pregnancy, Urine, POC Negative Negative   AMB POC GONADOTROPIN, CHORIONIC (HCG); QUALITATIVE    Collection Time: 10/11/22  9:36 AM   Result Value Ref Range    Valid Internal Control, POC yes     HCG, Pregnancy, Urine, POC      HCG Qualitative, POC Negative        ASSESSMENT and PLAN    Visit Diagnoses and Associated Orders       DUB (dysfunctional uterine bleeding)    -  Primary    AMB POC URINE PREGNANCY TEST, VISUAL COLOR COMPARISON [47145 CPT(R)]      AMB POC GONADOTROPIN, CHORIONIC (HCG); QUALITATIVE [13690 CPT(R)]      63331 - Venipuncture [65746 CPT(R)]           Bilateral leg pain        naproxen (NAPROSYN) 500 MG tablet [5393]                       Diagnosis Orders   1. DUB (dysfunctional uterine bleeding)  AMB POC URINE PREGNANCY TEST, VISUAL COLOR COMPARISON    AMB POC GONADOTROPIN, CHORIONIC (HCG); QUALITATIVE    42412 - Venipuncture      2. Bilateral leg pain  naproxen (NAPROSYN) 500 MG tablet      , Sanjana Alas was seen today for pain and other. Diagnoses and all orders for this visit:    DUB (dysfunctional uterine bleeding)  -     AMB POC URINE PREGNANCY TEST, VISUAL COLOR COMPARISON  -     AMB POC GONADOTROPIN, CHORIONIC (HCG); QUALITATIVE  -     33679 - Venipuncture    Bilateral leg pain  -     naproxen (NAPROSYN) 500 MG tablet;  Take 1 tablet by mouth 2 times daily as needed for Pain    , Recommended Naprosyn 500 mg twice daily leg exercises were discussed also discussed possibility of needing physical therapy after medication and stretching exercises for a week patient will call back in terms of her urine pregnancy test which came back negative and a qualitative serum pregnancy test which came back negative reassured patient that she is not pregnant and to follow-up with her OB/GYN/infertility specialist signs symptoms persist or worsen return back discussed constipation bladder issues etc.

## 2022-11-18 ENCOUNTER — OFFICE VISIT (OUTPATIENT)
Dept: FAMILY MEDICINE CLINIC | Facility: CLINIC | Age: 37
End: 2022-11-18
Payer: COMMERCIAL

## 2022-11-18 VITALS
SYSTOLIC BLOOD PRESSURE: 130 MMHG | WEIGHT: 158 LBS | DIASTOLIC BLOOD PRESSURE: 80 MMHG | HEIGHT: 61 IN | BODY MASS INDEX: 29.83 KG/M2

## 2022-11-18 DIAGNOSIS — R10.11 RIGHT UPPER QUADRANT ABDOMINAL PAIN: ICD-10-CM

## 2022-11-18 DIAGNOSIS — K59.09 OTHER CONSTIPATION: ICD-10-CM

## 2022-11-18 DIAGNOSIS — R14.0 ABDOMINAL BLOATING: Primary | ICD-10-CM

## 2022-11-18 LAB
H. PYLORI, POC: NEGATIVE
VALID INTERNAL CONTROL, POC: YES

## 2022-11-18 PROCEDURE — 99214 OFFICE O/P EST MOD 30 MIN: CPT | Performed by: FAMILY MEDICINE

## 2022-11-18 PROCEDURE — 86677 HELICOBACTER PYLORI ANTIBODY: CPT | Performed by: FAMILY MEDICINE

## 2022-11-18 RX ORDER — MEDROXYPROGESTERONE ACETATE 10 MG/1
10 TABLET ORAL
COMMUNITY
Start: 2022-01-17

## 2022-11-18 RX ORDER — PANTOPRAZOLE SODIUM 40 MG/1
40 TABLET, DELAYED RELEASE ORAL
Qty: 90 TABLET | Refills: 1 | Status: SHIPPED | OUTPATIENT
Start: 2022-11-18

## 2022-11-18 ASSESSMENT — ENCOUNTER SYMPTOMS
CONSTIPATION: 1
BLOOD IN STOOL: 0
NAUSEA: 0
WHEEZING: 0
ABDOMINAL DISTENTION: 1
SHORTNESS OF BREATH: 0
DIARRHEA: 0
COUGH: 0
ABDOMINAL PAIN: 1
VOMITING: 0
COLOR CHANGE: 0

## 2022-11-18 ASSESSMENT — PATIENT HEALTH QUESTIONNAIRE - PHQ9
SUM OF ALL RESPONSES TO PHQ QUESTIONS 1-9: 0
SUM OF ALL RESPONSES TO PHQ QUESTIONS 1-9: 0
2. FEELING DOWN, DEPRESSED OR HOPELESS: 0
SUM OF ALL RESPONSES TO PHQ9 QUESTIONS 1 & 2: 0
1. LITTLE INTEREST OR PLEASURE IN DOING THINGS: 0
SUM OF ALL RESPONSES TO PHQ QUESTIONS 1-9: 0
SUM OF ALL RESPONSES TO PHQ QUESTIONS 1-9: 0

## 2022-11-18 NOTE — PROGRESS NOTES
PROGRESS NOTE    SUBJECTIVE:   Bertha Templeton is a 40 y.o. female seen for a follow up visit regarding the following chief complaint:     Chief Complaint   Patient presents with    Bloated     Frequent abdominal bloating in the past month, denies pain           HPI  Presents office complaining of a recent 1 month history of abdominal bloating but refers over questioning patient states that when she eats greasy or fried foods her pain intensifies and now is happening more more with no matter what she eats we will do an H. pylori test in the office today came back negative    Past Medical History, Past Surgical History, Family history, Social History, and Medications were all reviewed with the patient today and updated as necessary. Current Outpatient Medications   Medication Sig Dispense Refill    medroxyPROGESTERone (PROVERA) 10 MG tablet 10 mg      pantoprazole (PROTONIX) 40 MG tablet Take 1 tablet by mouth every morning (before breakfast) 90 tablet 1    cyanocobalamin 1000 MCG tablet Take 1,000 mcg by mouth daily       No current facility-administered medications for this visit. Allergies   Allergen Reactions    Sumatriptan Palpitations    Aspirin Swelling     There is no problem list on file for this patient.     Past Medical History:   Diagnosis Date    Kidney stone 11/2019    2nd one at present     Past Surgical History:   Procedure Laterality Date    BREAST REDUCTION SURGERY Bilateral 2017    DILATION AND CURETTAGE OF UTERUS  2014    [address]  as infant     Family History   Problem Relation Age of Onset    Thyroid Disease Father     Hypertension Paternal Grandmother     Hypertension Father     Diabetes Mother     Hypertension Mother     Heart Disease Mother     Asthma Father     Diabetes Father      Social History     Tobacco Use    Smoking status: Never     Passive exposure: Never    Smokeless tobacco: Never   Substance Use Topics    Alcohol use: Never         Review of Systems Constitutional:  Negative for chills and fever. Respiratory:  Negative for cough, shortness of breath and wheezing. Cardiovascular:  Negative for chest pain and palpitations. Gastrointestinal:  Positive for abdominal distention, abdominal pain and constipation. Negative for blood in stool, diarrhea, nausea and vomiting. Admits to GERD   Skin:  Negative for color change. Allergic/Immunologic: Negative for food allergies. Neurological:  Negative for weakness and headaches. Hematological:  Negative for adenopathy. OBJECTIVE:  /80 (Site: Left Upper Arm, Position: Sitting, Cuff Size: Small Adult)   Ht 5' 1\" (1.549 m)   Wt 158 lb (71.7 kg)   BMI 29.85 kg/m²      Physical Exam     Medical problems and test results were reviewed with the patient today. Recent Results (from the past 672 hour(s))   AMB POC HELICOBACTER PYLORI    Collection Time: 11/18/22  8:53 AM   Result Value Ref Range    Valid Internal Control, POC yes     H. pylori, POC NEGATIVE        ASSESSMENT and PLAN    Visit Diagnoses and Associated Orders       Abdominal bloating    -  Primary    AMB POC HELICOBACTER PYLORI [47855 CPT(R)]      COLLECTION CAPILLARY BLOOD SPECIMEN [39013 CPT(R)]           Right upper quadrant abdominal pain        pantoprazole (PROTONIX) 40 MG tablet [29748]      US ABDOMEN COMPLETE [38498 Custom]   - Future Order    NM HEPATOBILIARY SCAN W EJECTION FRACTION [14858 Custom]   - Future Order         Other constipation             ORDERS WITHOUT AN ASSOCIATED DIAGNOSIS    medroxyPROGESTERone (PROVERA) 10 MG tablet [1074]                  Diagnosis Orders   1. Abdominal bloating  AMB POC HELICOBACTER PYLORI    COLLECTION CAPILLARY BLOOD SPECIMEN      2. Right upper quadrant abdominal pain  pantoprazole (PROTONIX) 40 MG tablet    US ABDOMEN COMPLETE    NM HEPATOBILIARY SCAN W EJECTION FRACTION      3. Other constipation        , [facility] was seen today for bloated.     Diagnoses and all orders for this visit:    Abdominal bloating  -     AMB POC HELICOBACTER PYLORI  -     COLLECTION CAPILLARY BLOOD SPECIMEN    Right upper quadrant abdominal pain  -     pantoprazole (PROTONIX) 40 MG tablet; Take 1 tablet by mouth every morning (before breakfast)  -     US ABDOMEN COMPLETE; Future  -     NM HEPATOBILIARY SCAN W EJECTION FRACTION;  Future    Other constipation    , H. pylori negative we will start patient on Protonix we will schedule her for an ultrasound and HIDA scan if the ultrasound is negative and she will return back for follow-up or phone call visit to go over the results we will also discussed at length about foods to hold what triggers risk benefits and options supportive care given precautions given recommended MiraLAX on a daily basis for her constipation plenty of water fruits and vegetables etc.

## 2022-11-29 ENCOUNTER — HOSPITAL ENCOUNTER (OUTPATIENT)
Dept: ULTRASOUND IMAGING | Age: 37
Discharge: HOME | End: 2022-12-01
Payer: COMMERCIAL

## 2022-11-29 DIAGNOSIS — R10.11 RIGHT UPPER QUADRANT ABDOMINAL PAIN: ICD-10-CM

## 2022-11-29 PROCEDURE — 76705 ECHO EXAM OF ABDOMEN: CPT

## 2023-01-31 ENCOUNTER — NURSE ONLY (OUTPATIENT)
Dept: FAMILY MEDICINE CLINIC | Facility: CLINIC | Age: 38
End: 2023-01-31

## 2023-01-31 DIAGNOSIS — Z00.00 LABORATORY EXAMINATION ORDERED AS PART OF A ROUTINE GENERAL MEDICAL EXAMINATION: Primary | ICD-10-CM

## 2023-01-31 DIAGNOSIS — Z11.59 NEED FOR HEPATITIS C SCREENING TEST: ICD-10-CM

## 2023-01-31 DIAGNOSIS — Z00.00 ROUTINE GENERAL MEDICAL EXAMINATION AT A HEALTH CARE FACILITY: ICD-10-CM

## 2023-01-31 LAB
25(OH)D3 SERPL-MCNC: 16.6 NG/ML (ref 30–100)
ALBUMIN SERPL-MCNC: 3.9 G/DL (ref 3.5–5)
ALBUMIN/GLOB SERPL: 1.1 (ref 0.4–1.6)
ALP SERPL-CCNC: 102 U/L (ref 50–136)
ALT SERPL-CCNC: 40 U/L (ref 12–65)
ANION GAP SERPL CALC-SCNC: 9 MMOL/L (ref 2–11)
APPEARANCE UR: ABNORMAL
AST SERPL-CCNC: 18 U/L (ref 15–37)
BACTERIA URNS QL MICRO: ABNORMAL /HPF
BILIRUB SERPL-MCNC: 0.2 MG/DL (ref 0.2–1.1)
BILIRUB UR QL: NEGATIVE
BUN SERPL-MCNC: 12 MG/DL (ref 6–23)
CALCIUM SERPL-MCNC: 8.8 MG/DL (ref 8.3–10.4)
CASTS URNS QL MICRO: ABNORMAL /LPF (ref 0–2)
CHLORIDE SERPL-SCNC: 106 MMOL/L (ref 101–110)
CHOLEST SERPL-MCNC: 189 MG/DL
CO2 SERPL-SCNC: 26 MMOL/L (ref 21–32)
COLOR UR: ABNORMAL
CREAT SERPL-MCNC: 0.8 MG/DL (ref 0.6–1)
EPI CELLS #/AREA URNS HPF: ABNORMAL /HPF (ref 0–5)
GLOBULIN SER CALC-MCNC: 3.7 G/DL (ref 2.8–4.5)
GLUCOSE SERPL-MCNC: 91 MG/DL (ref 65–100)
GLUCOSE UR STRIP.AUTO-MCNC: NEGATIVE MG/DL
HCV AB SER QL: NONREACTIVE
HDLC SERPL-MCNC: 27 MG/DL (ref 40–60)
HDLC SERPL: 7
HGB UR QL STRIP: ABNORMAL
KETONES UR QL STRIP.AUTO: ABNORMAL MG/DL
LDLC SERPL CALC-MCNC: ABNORMAL MG/DL
LDLC SERPL DIRECT ASSAY-MCNC: 98 MG/DL
LEUKOCYTE ESTERASE UR QL STRIP.AUTO: NEGATIVE
NITRITE UR QL STRIP.AUTO: NEGATIVE
PH UR STRIP: 6 (ref 5–9)
POTASSIUM SERPL-SCNC: 4.3 MMOL/L (ref 3.5–5.1)
PROT SERPL-MCNC: 7.6 G/DL (ref 6.3–8.2)
PROT UR STRIP-MCNC: NEGATIVE MG/DL
RBC #/AREA URNS HPF: ABNORMAL /HPF (ref 0–5)
SODIUM SERPL-SCNC: 141 MMOL/L (ref 133–143)
SP GR UR REFRACTOMETRY: 1.03 (ref 1–1.02)
TRIGL SERPL-MCNC: 542 MG/DL (ref 35–150)
TSH, 3RD GENERATION: 3.13 UIU/ML (ref 0.36–3.74)
UROBILINOGEN UR QL STRIP.AUTO: 0.2 EU/DL (ref 0.2–1)
VLDLC SERPL CALC-MCNC: 108.4 MG/DL (ref 6–23)
WBC URNS QL MICRO: ABNORMAL /HPF (ref 0–4)

## 2023-02-01 LAB
HIV 1+2 AB+HIV1 P24 AG SERPL QL IA: NONREACTIVE
HIV 1/2 RESULT COMMENT: NORMAL

## 2023-02-09 ENCOUNTER — OFFICE VISIT (OUTPATIENT)
Dept: FAMILY MEDICINE CLINIC | Facility: CLINIC | Age: 38
End: 2023-02-09
Payer: COMMERCIAL

## 2023-02-09 VITALS
HEIGHT: 61 IN | SYSTOLIC BLOOD PRESSURE: 126 MMHG | BODY MASS INDEX: 30.58 KG/M2 | WEIGHT: 162 LBS | DIASTOLIC BLOOD PRESSURE: 80 MMHG

## 2023-02-09 DIAGNOSIS — G43.101 MIGRAINE WITH AURA AND WITH STATUS MIGRAINOSUS, NOT INTRACTABLE: ICD-10-CM

## 2023-02-09 DIAGNOSIS — E78.01 FAMILIAL HYPERCHOLESTEROLEMIA: ICD-10-CM

## 2023-02-09 DIAGNOSIS — Z13.31 SCREENING FOR DEPRESSION: ICD-10-CM

## 2023-02-09 DIAGNOSIS — K21.9 GERD WITHOUT ESOPHAGITIS: ICD-10-CM

## 2023-02-09 DIAGNOSIS — H04.123 DRY EYES: ICD-10-CM

## 2023-02-09 DIAGNOSIS — R30.0 DYSURIA: ICD-10-CM

## 2023-02-09 DIAGNOSIS — H53.8 BLURRED VISION: ICD-10-CM

## 2023-02-09 DIAGNOSIS — F41.1 GENERALIZED ANXIETY DISORDER: ICD-10-CM

## 2023-02-09 DIAGNOSIS — E66.09 CLASS 1 OBESITY DUE TO EXCESS CALORIES WITH SERIOUS COMORBIDITY AND BODY MASS INDEX (BMI) OF 30.0 TO 30.9 IN ADULT: ICD-10-CM

## 2023-02-09 DIAGNOSIS — Z00.00 ROUTINE GENERAL MEDICAL EXAMINATION AT A HEALTH CARE FACILITY: Primary | ICD-10-CM

## 2023-02-09 LAB
BILIRUBIN, URINE, POC: NEGATIVE
BLOOD URINE, POC: NEGATIVE
GLUCOSE URINE, POC: NEGATIVE
KETONES, URINE, POC: NEGATIVE
LEUKOCYTE ESTERASE, URINE, POC: NEGATIVE
NITRITE, URINE, POC: NEGATIVE
PH, URINE, POC: 6 (ref 4.6–8)
PROTEIN,URINE, POC: NEGATIVE
SPECIFIC GRAVITY, URINE, POC: 1.02 (ref 1–1.03)
URINALYSIS CLARITY, POC: CLEAR
URINALYSIS COLOR, POC: YELLOW
UROBILINOGEN, POC: NORMAL

## 2023-02-09 PROCEDURE — 81003 URINALYSIS AUTO W/O SCOPE: CPT | Performed by: FAMILY MEDICINE

## 2023-02-09 PROCEDURE — 99395 PREV VISIT EST AGE 18-39: CPT | Performed by: FAMILY MEDICINE

## 2023-02-09 RX ORDER — RIZATRIPTAN BENZOATE 10 MG/1
10 TABLET ORAL
Qty: 9 TABLET | Refills: 5 | Status: SHIPPED | OUTPATIENT
Start: 2023-02-09 | End: 2023-02-09

## 2023-02-09 RX ORDER — PANTOPRAZOLE SODIUM 40 MG/1
40 TABLET, DELAYED RELEASE ORAL
Qty: 90 TABLET | Refills: 3 | Status: SHIPPED | OUTPATIENT
Start: 2023-02-09

## 2023-02-09 RX ORDER — ESCITALOPRAM OXALATE 10 MG/1
10 TABLET ORAL DAILY
Qty: 90 TABLET | Refills: 3 | Status: SHIPPED | OUTPATIENT
Start: 2023-02-09

## 2023-02-09 SDOH — ECONOMIC STABILITY: FOOD INSECURITY: WITHIN THE PAST 12 MONTHS, THE FOOD YOU BOUGHT JUST DIDN'T LAST AND YOU DIDN'T HAVE MONEY TO GET MORE.: NEVER TRUE

## 2023-02-09 SDOH — ECONOMIC STABILITY: INCOME INSECURITY: HOW HARD IS IT FOR YOU TO PAY FOR THE VERY BASICS LIKE FOOD, HOUSING, MEDICAL CARE, AND HEATING?: NOT HARD AT ALL

## 2023-02-09 SDOH — ECONOMIC STABILITY: FOOD INSECURITY: WITHIN THE PAST 12 MONTHS, YOU WORRIED THAT YOUR FOOD WOULD RUN OUT BEFORE YOU GOT MONEY TO BUY MORE.: NEVER TRUE

## 2023-02-09 SDOH — ECONOMIC STABILITY: HOUSING INSECURITY
IN THE LAST 12 MONTHS, WAS THERE A TIME WHEN YOU DID NOT HAVE A STEADY PLACE TO SLEEP OR SLEPT IN A SHELTER (INCLUDING NOW)?: NO

## 2023-02-09 ASSESSMENT — ENCOUNTER SYMPTOMS
EYE DISCHARGE: 0
COUGH: 0
SHORTNESS OF BREATH: 0
EYE ITCHING: 1
ABDOMINAL PAIN: 0

## 2023-02-09 ASSESSMENT — PATIENT HEALTH QUESTIONNAIRE - PHQ9
SUM OF ALL RESPONSES TO PHQ QUESTIONS 1-9: 0
2. FEELING DOWN, DEPRESSED OR HOPELESS: 0
1. LITTLE INTEREST OR PLEASURE IN DOING THINGS: 0
SUM OF ALL RESPONSES TO PHQ QUESTIONS 1-9: 0
SUM OF ALL RESPONSES TO PHQ QUESTIONS 1-9: 0
SUM OF ALL RESPONSES TO PHQ9 QUESTIONS 1 & 2: 0
SUM OF ALL RESPONSES TO PHQ QUESTIONS 1-9: 0

## 2023-02-09 NOTE — PROGRESS NOTES
PROGRESS NOTE    SUBJECTIVE:   Priyanka Abdalla is a 40 y.o. female seen for a follow up visit regarding the following chief complaint:     Chief Complaint   Patient presents with    Annual Exam    Discuss Labs    Migraine     Frequent migraine with blurry vision and nausea, dry eyes. HPI presents office today for complete physical along with a list of multiple other complaints      Past Medical History, Past Surgical History, Family history, Social History, and Medications were all reviewed with the patient today and updated as necessary. Current Outpatient Medications   Medication Sig Dispense Refill    pantoprazole (PROTONIX) 40 MG tablet Take 1 tablet by mouth every morning (before breakfast) 90 tablet 3    rizatriptan (MAXALT) 10 MG tablet Take 1 tablet by mouth once as needed for Migraine May repeat in 2 hours if needed 9 tablet 5    escitalopram (LEXAPRO) 10 MG tablet Take 1 tablet by mouth daily 90 tablet 3    medroxyPROGESTERone (PROVERA) 10 MG tablet 10 mg      cyanocobalamin 1000 MCG tablet Take 1,000 mcg by mouth daily       No current facility-administered medications for this visit. Allergies   Allergen Reactions    Sumatriptan Palpitations    Aspirin Swelling     There is no problem list on file for this patient.     Past Medical History:   Diagnosis Date    Kidney stone 11/2019    2nd one at present     Past Surgical History:   Procedure Laterality Date    BREAST REDUCTION SURGERY Bilateral 2017    DILATION AND CURETTAGE OF UTERUS  2014    [address]  as infant     Family History   Problem Relation Age of Onset    Thyroid Disease Father     Hypertension Paternal Grandmother     Hypertension Father     Diabetes Mother     Hypertension Mother     Heart Disease Mother     Asthma Father     Diabetes Father      Social History     Tobacco Use    Smoking status: Never     Passive exposure: Never    Smokeless tobacco: Never   Substance Use Topics    Alcohol use: Never Review of Systems   Constitutional:  Negative for chills and fever. Eyes:  Positive for itching and visual disturbance. Negative for discharge. Respiratory:  Negative for cough and shortness of breath. Cardiovascular:  Positive for palpitations. Negative for chest pain. Gastrointestinal:  Negative for abdominal pain. Endocrine: Negative for cold intolerance and heat intolerance. Genitourinary:  Negative for difficulty urinating, frequency, hematuria, pelvic pain, vaginal bleeding, vaginal discharge and vaginal pain. Neurological:  Positive for headaches. Psychiatric/Behavioral: Negative. OBJECTIVE:  /80 (Site: Left Upper Arm, Position: Sitting, Cuff Size: Small Adult)   Ht 5' 1\" (1.549 m)   Wt 162 lb (73.5 kg)   LMP 01/15/2023   BMI 30.61 kg/m²      Physical Exam  Vitals and nursing note reviewed. Constitutional:       General: She is not in acute distress. Appearance: Normal appearance. She is obese. HENT:      Head: Normocephalic and atraumatic. Nose: Nose normal.      Mouth/Throat:      Mouth: Mucous membranes are moist.      Pharynx: No oropharyngeal exudate or posterior oropharyngeal erythema. Eyes:      Extraocular Movements: Extraocular movements intact. Conjunctiva/sclera: Conjunctivae normal.      Pupils: Pupils are equal, round, and reactive to light. Cardiovascular:      Rate and Rhythm: Normal rate and regular rhythm. Pulses: Normal pulses. Heart sounds: Normal heart sounds. Pulmonary:      Effort: Pulmonary effort is normal. No respiratory distress. Breath sounds: Normal breath sounds. No wheezing. Abdominal:      General: Abdomen is flat. Bowel sounds are normal.      Palpations: Abdomen is soft. There is no mass. Hernia: No hernia is present. Genitourinary:     Comments: Deferred done by OB/GYN  Musculoskeletal:         General: Normal range of motion. Cervical back: Normal range of motion and neck supple. Skin:     General: Skin is warm and dry. Capillary Refill: Capillary refill takes less than 2 seconds. Neurological:      General: No focal deficit present. Mental Status: She is alert and oriented to person, place, and time. Psychiatric:         Mood and Affect: Mood normal.         Behavior: Behavior normal.         Thought Content: Thought content normal.         Judgment: Judgment normal.        Medical problems and test results were reviewed with the patient today.      Recent Results (from the past 672 hour(s))   HIV 1/2 Ag/Ab, 4TH Generation,W Rflx Confirm    Collection Time: 01/31/23  9:20 AM   Result Value Ref Range    HIV 1/2 Interp NONREACTIVE NONREACTIVE      HIV 1/2 Result Comment SEE NOTE     Hepatitis C Antibody    Collection Time: 01/31/23  9:20 AM   Result Value Ref Range    Hepatitis C Ab NONREACTIVE NONREACTIVE     Vitamin D 25 Hydroxy    Collection Time: 01/31/23  9:20 AM   Result Value Ref Range    Vit D, 25-Hydroxy 16.6 (L) 30.0 - 100.0 ng/mL   Urinalysis    Collection Time: 01/31/23  9:20 AM   Result Value Ref Range    Color, UA YELLOW/STRAW      Appearance CLOUDY      Specific Gravity, UA 1.027 (H) 1.001 - 1.023      pH, Urine 6.0 5.0 - 9.0      Protein, UA Negative Negative mg/dL    Glucose, UA Negative mg/dL    Ketones, Urine TRACE (A) Negative mg/dL    Bilirubin Urine Negative Negative      Blood, Urine TRACE (A) Negative      Urobilinogen, Urine 0.2 0.2 - 1.0 EU/dL    Nitrite, Urine Negative Negative      Leukocyte Esterase, Urine Negative Negative      WBC, UA 0-4 0 - 4 /hpf    RBC, UA 20-50 (A) 0 - 5 /hpf    Epithelial Cells UA 20-50 (A) 0 - 5 /hpf    BACTERIA, URINE TOO NUMEROUS TO COUNT (A) Negative /hpf    Casts 0-2 0 - 2 /lpf   TSH    Collection Time: 01/31/23  9:20 AM   Result Value Ref Range    TSH, 3RD GENERATION 3.130 0.358 - 3.740 uIU/mL   Lipid Panel    Collection Time: 01/31/23  9:20 AM   Result Value Ref Range    Cholesterol, Total 189 <200 MG/DL Triglycerides 542 (H) 35 - 150 MG/DL    HDL 27 (L) 40 - 60 MG/DL    LDL Calculated Not calculated due to elevated triglyceride level <100 MG/DL    VLDL Cholesterol Calculated 108.4 (H) 6.0 - 23.0 MG/DL    Chol/HDL Ratio 7.0     Comprehensive Metabolic Panel    Collection Time: 01/31/23  9:20 AM   Result Value Ref Range    Sodium 141 133 - 143 mmol/L    Potassium 4.3 3.5 - 5.1 mmol/L    Chloride 106 101 - 110 mmol/L    CO2 26 21 - 32 mmol/L    Anion Gap 9 2 - 11 mmol/L    Glucose 91 65 - 100 mg/dL    BUN 12 6 - 23 MG/DL    Creatinine 0.80 0.6 - 1.0 MG/DL    Est, Glom Filt Rate >60 >60 ml/min/1.73m2    Calcium 8.8 8.3 - 10.4 MG/DL    Total Bilirubin 0.2 0.2 - 1.1 MG/DL    ALT 40 12 - 65 U/L    AST 18 15 - 37 U/L    Alk Phosphatase 102 50 - 136 U/L    Total Protein 7.6 6.3 - 8.2 g/dL    Albumin 3.9 3.5 - 5.0 g/dL    Globulin 3.7 2.8 - 4.5 g/dL    Albumin/Globulin Ratio 1.1 0.4 - 1.6     LDL Cholesterol, Direct    Collection Time: 01/31/23  9:20 AM   Result Value Ref Range    LDL Direct 98 <100 mg/dl       ASSESSMENT and PLAN    Visit Diagnoses and Associated Orders       Routine general medical examination at a health care facility    -  Primary         GERD without esophagitis        pantoprazole (PROTONIX) 40 MG tablet [84899]           Dysuria        AMB POC URINALYSIS DIP STICK AUTO W/O MICRO [41923 CPT(R)]           Generalized anxiety disorder        escitalopram (LEXAPRO) 10 MG tablet [[id number]]           Migraine with aura and with status migrainosus, not intractable        rizatriptan (MAXALT) 10 MG tablet [[id number]]           Screening for depression             Familial hypercholesterolemia                         Diagnosis Orders   1. Routine general medical examination at a health care facility        2. GERD without esophagitis  pantoprazole (PROTONIX) 40 MG tablet      3. Dysuria  AMB POC URINALYSIS DIP STICK AUTO W/O MICRO      4. Generalized anxiety disorder  escitalopram (LEXAPRO) 10 MG tablet      5. Migraine with aura and with status migrainosus, not intractable  rizatriptan (MAXALT) 10 MG tablet      6. Screening for depression        7. Familial hypercholesterolemia        8. Dry eyes  External Referral to Ophthalmology      9. Blurred vision  External Referral to Ophthalmology      10. Class 1 obesity due to excess calories with serious comorbidity and body mass index (BMI) of 30.0 to 30.9 in adult        , Kailyn Valle was seen today for annual exam, discuss labs and migraine. Diagnoses and all orders for this visit:    Routine general medical examination at a health care facility    GERD without esophagitis  -     pantoprazole (PROTONIX) 40 MG tablet; Take 1 tablet by mouth every morning (before breakfast)    Dysuria  -     AMB POC URINALYSIS DIP STICK AUTO W/O MICRO    Generalized anxiety disorder  -     escitalopram (LEXAPRO) 10 MG tablet; Take 1 tablet by mouth daily    Migraine with aura and with status migrainosus, not intractable  -     rizatriptan (MAXALT) 10 MG tablet;  Take 1 tablet by mouth once as needed for Migraine May repeat in 2 hours if needed    Screening for depression    Familial hypercholesterolemia    Dry eyes  -     External Referral to Ophthalmology    Blurred vision  -     External Referral to Ophthalmology    Class 1 obesity due to excess calories with serious comorbidity and body mass index (BMI) of 30.0 to 30.9 in adult    , Normal routine physical exam reviewed her labs repeated a urine went over her cholesterol went over her long list of other complications as noted above most importantly is that she states that she took Imitrex once her face tingle she had palpitations and she did not want to take it again very high strung a lot of anxiety never really read the information for migraines because when questioned about it she is following everything that I gave her on the migraine handout she looked at me like she did not know what I was talking about also complaining of dry eyes blurred vision and wants to see an ophthalmologist.depression. I have spent a total of 8-15 minutes assessing, reviewing, and discussing the depression screening with patient in office today.   Repeat urine today came back

## 2023-04-10 PROBLEM — R00.2 PALPITATIONS: Status: ACTIVE | Noted: 2023-04-10

## 2023-05-25 ENCOUNTER — OFFICE VISIT (OUTPATIENT)
Age: 38
End: 2023-05-25

## 2023-05-25 VITALS
HEART RATE: 80 BPM | SYSTOLIC BLOOD PRESSURE: 110 MMHG | HEIGHT: 61 IN | BODY MASS INDEX: 29.27 KG/M2 | DIASTOLIC BLOOD PRESSURE: 70 MMHG | WEIGHT: 155 LBS

## 2023-05-25 DIAGNOSIS — R00.2 PALPITATIONS: Primary | ICD-10-CM

## 2023-05-25 ASSESSMENT — ENCOUNTER SYMPTOMS
ORTHOPNEA: 0
VOMITING: 0
BOWEL INCONTINENCE: 0
COUGH: 0
HEMATEMESIS: 0
WHEEZING: 0
COLOR CHANGE: 0
ABDOMINAL PAIN: 0
HOARSE VOICE: 0
SPUTUM PRODUCTION: 0
NAUSEA: 0
HEMATOCHEZIA: 0
SHORTNESS OF BREATH: 0
DIARRHEA: 0
BLURRED VISION: 0

## 2023-05-25 NOTE — PROGRESS NOTES
Extraocular Movements: Extraocular movements intact. Pupils: Pupils are equal, round, and reactive to light. Neck:      Vascular: No carotid bruit. Cardiovascular:      Rate and Rhythm: Regular rhythm. Pulses: Normal pulses. Heart sounds: No murmur heard. Pulmonary:      Effort: Pulmonary effort is normal.      Breath sounds: Normal breath sounds. Abdominal:      General: Abdomen is flat. Bowel sounds are normal.      Palpations: Abdomen is soft. Musculoskeletal:         General: Normal range of motion. Cervical back: Normal range of motion and neck supple. Skin:     General: Skin is warm and dry. Neurological:      General: No focal deficit present. Mental Status: She is alert and oriented to person, place, and time. Psychiatric:         Mood and Affect: Mood normal.       Medical problems and test results were reviewed with the patient today. No results found for this or any previous visit (from the past 672 hour(s)). Lab Results   Component Value Date/Time    CHOL 189 01/31/2023 09:20 AM    HDL 27 01/31/2023 09:20 AM    VLDL 29 03/11/2022 09:29 AM     No results found for any visits on 05/25/23. ASSESSMENT and Miri Gregorio was seen today for follow-up, 6 month follow-up and results. Diagnoses and all orders for this visit:    Palpitations:Normal 30 day ambulatory monitor. Disposition:    Return if symptoms worsen or fail to improve.                 Pedro Orourke MD  5/25/2023  8:18 AM

## 2023-07-18 ENCOUNTER — TELEPHONE (OUTPATIENT)
Age: 38
End: 2023-07-18

## 2023-07-18 DIAGNOSIS — R00.2 PALPITATIONS: Primary | ICD-10-CM

## 2023-07-18 DIAGNOSIS — R00.0 RAPID OR IRREGULAR HEARTBEAT: ICD-10-CM

## 2023-07-18 NOTE — TELEPHONE ENCOUNTER
Pt c/o palpitations at rest. States apple watch shows -140. Lasts seconds and she feels badly with it. Asking for f/u appt with Dr. Jessika Carver.

## 2023-07-18 NOTE — TELEPHONE ENCOUNTER
Triage informed Dr. Bhanu Mark. Per Dr. Bhanu Mark, arrange consult with EP for consideration of loop monitor. Triage called pt. There is no answer and no voicemail box set up yet.   Triage placed order for EP referral.

## 2023-07-18 NOTE — TELEPHONE ENCOUNTER
ISOSORBIDE MONONITRATE ER 30 MG Tablet Extended Release 24 Hour      Last Written Prescription Date:  6/30/2020  Last Fill Quantity: 60,   # refills: 0  Last Office Visit : 2/24/2020  Future Office visit:  None    Routing refill request to provider for review/approval because:  Last office visit,  Pt asked to follow up in 6 months??  No follow up visit noted?  Abnormal B/P??     Follow up B/P check?   Change Med?   Only 60 days sent to pharm last refill?   Continue same dose/med?   Refer to clinic for review  02/24/20 (!) 154/82   12/28/19 137/82   12/26/19 (!) 155/80          Berna Bustos RN  Central Triage Red Flags/Med Refills     Patient states her heart is racing and her apple watching says she is in Afib. Wants to see Dr Fabrice Dueñas or someone ASAP.  Please call     Chart says Faroese but she speaks good english

## 2023-07-19 NOTE — TELEPHONE ENCOUNTER
Called pt. No answer.   Left message asking for call back to discuss Dr. Tracey Zendejas response to her call yesterday./wc

## 2023-08-24 ENCOUNTER — OFFICE VISIT (OUTPATIENT)
Age: 38
End: 2023-08-24
Payer: COMMERCIAL

## 2023-08-24 VITALS
BODY MASS INDEX: 29.66 KG/M2 | SYSTOLIC BLOOD PRESSURE: 120 MMHG | HEIGHT: 61 IN | DIASTOLIC BLOOD PRESSURE: 76 MMHG | WEIGHT: 157.1 LBS | HEART RATE: 69 BPM

## 2023-08-24 DIAGNOSIS — R06.00 DYSPNEA, UNSPECIFIED TYPE: ICD-10-CM

## 2023-08-24 DIAGNOSIS — R07.9 CHEST PAIN, UNSPECIFIED TYPE: ICD-10-CM

## 2023-08-24 DIAGNOSIS — R00.2 PALPITATIONS: Primary | ICD-10-CM

## 2023-08-24 PROCEDURE — 93000 ELECTROCARDIOGRAM COMPLETE: CPT | Performed by: INTERNAL MEDICINE

## 2023-08-24 PROCEDURE — 99214 OFFICE O/P EST MOD 30 MIN: CPT | Performed by: INTERNAL MEDICINE

## 2023-08-24 RX ORDER — CHLORAL HYDRATE 500 MG
CAPSULE ORAL
COMMUNITY

## 2023-08-24 ASSESSMENT — PATIENT HEALTH QUESTIONNAIRE - PHQ9
1. LITTLE INTEREST OR PLEASURE IN DOING THINGS: 0
SUM OF ALL RESPONSES TO PHQ QUESTIONS 1-9: 0
SUM OF ALL RESPONSES TO PHQ QUESTIONS 1-9: 0
SUM OF ALL RESPONSES TO PHQ9 QUESTIONS 1 & 2: 0
SUM OF ALL RESPONSES TO PHQ QUESTIONS 1-9: 0
2. FEELING DOWN, DEPRESSED OR HOPELESS: 0
SUM OF ALL RESPONSES TO PHQ QUESTIONS 1-9: 0

## 2023-08-24 ASSESSMENT — ENCOUNTER SYMPTOMS
VISUAL CHANGE: 0
CHANGE IN BOWEL HABIT: 0
BLURRED VISION: 0
SPUTUM PRODUCTION: 0
ORTHOPNEA: 0
DIARRHEA: 0
HOARSE VOICE: 0
VOMITING: 0
NAUSEA: 0
HEMATOCHEZIA: 0
COLOR CHANGE: 0
SORE THROAT: 0
SHORTNESS OF BREATH: 1
HEMATEMESIS: 0
COUGH: 0
BOWEL INCONTINENCE: 0
WHEEZING: 0
ABDOMINAL PAIN: 0
SWOLLEN GLANDS: 0

## 2023-08-24 NOTE — PROGRESS NOTES
MCG tablet, Take 1 tablet by mouth daily, Disp: , Rfl:   Allergies   Allergen Reactions    Sumatriptan Palpitations    Aspirin Swelling     Past Medical History:   Diagnosis Date    Kidney stone 11/2019    2nd one at present     Past Surgical History:   Procedure Laterality Date    BREAST REDUCTION SURGERY Bilateral 2017    DILATION AND CURETTAGE OF UTERUS  2014    Nicolas Wiggins  as infant     Family History   Problem Relation Age of Onset    Thyroid Disease Father     Hypertension Paternal Grandmother     Hypertension Father     Diabetes Mother     Hypertension Mother     Heart Disease Mother     Asthma Father     Diabetes Father       Social History     Tobacco Use    Smoking status: Never     Passive exposure: Never    Smokeless tobacco: Never   Substance Use Topics    Alcohol use: Never       ROS:    Review of Systems   Constitutional: Negative for chills, decreased appetite, diaphoresis, fatigue, fever and malaise/fatigue. HENT:  Negative for congestion, hearing loss, hoarse voice, nosebleeds and sore throat. Eyes:  Negative for blurred vision. Cardiovascular:  Positive for chest pain and palpitations. Negative for claudication, cyanosis, dyspnea on exertion, irregular heartbeat, leg swelling, near-syncope, orthopnea, paroxysmal nocturnal dyspnea and syncope. Respiratory:  Positive for shortness of breath. Negative for cough, sputum production and wheezing. Endocrine: Negative for polydipsia, polyphagia and polyuria. Skin:  Negative for color change and rash. Musculoskeletal:  Negative for arthralgias, joint swelling, myalgias and neck pain. Gastrointestinal:  Negative for abdominal pain, anorexia, change in bowel habit, bowel incontinence, diarrhea, hematemesis, hematochezia, nausea and vomiting. Genitourinary:  Negative for dysuria, frequency and hematuria.    Neurological:  Negative for focal weakness, headaches, light-headedness, loss of balance, numbness, sensory change, vertigo and

## 2023-10-12 ENCOUNTER — OFFICE VISIT (OUTPATIENT)
Age: 38
End: 2023-10-12

## 2023-10-12 VITALS
SYSTOLIC BLOOD PRESSURE: 116 MMHG | DIASTOLIC BLOOD PRESSURE: 84 MMHG | WEIGHT: 155 LBS | HEIGHT: 61 IN | HEART RATE: 59 BPM | BODY MASS INDEX: 29.27 KG/M2

## 2023-10-12 DIAGNOSIS — R00.2 PALPITATIONS: Primary | ICD-10-CM

## 2023-10-12 ASSESSMENT — ENCOUNTER SYMPTOMS
DIARRHEA: 0
WHEEZING: 0
COUGH: 0
COLOR CHANGE: 0
HOARSE VOICE: 0
VOMITING: 0
BLURRED VISION: 0
ABDOMINAL PAIN: 0
HEMATEMESIS: 0
SHORTNESS OF BREATH: 0
HEMATOCHEZIA: 0
BOWEL INCONTINENCE: 0
SPUTUM PRODUCTION: 0
ORTHOPNEA: 0
NAUSEA: 0

## 2023-10-12 NOTE — PROGRESS NOTES
[facility] CARDIOLOGY  [address] [address]  PHONE: [phone]                NAME:  Hola Valentino  : 1985  MRN: [id number]       SUBJECTIVE:   Hola Valentino is a 41 y.o. female seen for a follow up visit regarding the following: Recently,the patient was seen for complaints of tachycardia and concern regarding possible CAD. Follow up echo was normal.Follow up stress MPI scan was normal.Previous 30 day ambulatory ECG was normal.She returns for follow up discussion of recent tests. I discussed test results with the patient. Chief Complaint   Patient presents with    Palpitations       HPI:    Palpitations   This is a chronic problem. The problem occurs rarely. The problem has been unchanged. The symptoms are aggravated by stress. Associated symptoms include anxiety. Pertinent negatives include no chest fullness, chest pain, coughing, diaphoresis, dizziness, fever, irregular heartbeat, malaise/fatigue, nausea, near-syncope, numbness, shortness of breath, syncope, vomiting or weakness. She has tried nothing for the symptoms. Past Medical History, Past Surgical History, Family history, Social History, and Medications were all reviewed with the patient today and updated as necessary.          Current Outpatient Medications:     Omega-3 1000 MG CAPS, Take by mouth, Disp: , Rfl:     escitalopram (LEXAPRO) 10 MG tablet, Take 1 tablet by mouth daily, Disp: 90 tablet, Rfl: 3    medroxyPROGESTERone (PROVERA) 10 MG tablet, 1 tablet, Disp: , Rfl:     cyanocobalamin 1000 MCG tablet, Take 1 tablet by mouth daily, Disp: , Rfl:   Allergies   Allergen Reactions    Sumatriptan Palpitations    Aspirin Swelling     Past Medical History:   Diagnosis Date    Kidney stone 2019    2nd one at present     Past Surgical History:   Procedure Laterality Date    BREAST REDUCTION SURGERY Bilateral 2017    DILATION AND CURETTAGE OF UTERUS      Nicolas Wiggins  as infant

## 2024-02-16 ENCOUNTER — NURSE ONLY (OUTPATIENT)
Dept: FAMILY MEDICINE CLINIC | Facility: CLINIC | Age: 39
End: 2024-02-16
Payer: COMMERCIAL

## 2024-02-16 DIAGNOSIS — Z00.00 LABORATORY EXAMINATION ORDERED AS PART OF A ROUTINE GENERAL MEDICAL EXAMINATION: Primary | ICD-10-CM

## 2024-02-16 DIAGNOSIS — E55.9 VITAMIN D DEFICIENCY: ICD-10-CM

## 2024-02-16 LAB
25(OH)D3 SERPL-MCNC: 24.1 NG/ML (ref 30–100)
ALBUMIN SERPL-MCNC: 3.9 G/DL (ref 3.5–5)
ALBUMIN/GLOB SERPL: 1 (ref 0.4–1.6)
ALP SERPL-CCNC: 100 U/L (ref 50–136)
ALT SERPL-CCNC: 16 U/L (ref 12–65)
ANION GAP SERPL CALC-SCNC: 5 MMOL/L (ref 2–11)
AST SERPL-CCNC: 18 U/L (ref 15–37)
BASOPHILS # BLD: 0 K/UL (ref 0–0.2)
BASOPHILS NFR BLD: 1 % (ref 0–2)
BILIRUB SERPL-MCNC: 0.4 MG/DL (ref 0.2–1.1)
BUN SERPL-MCNC: 10 MG/DL (ref 6–23)
CALCIUM SERPL-MCNC: 9.6 MG/DL (ref 8.3–10.4)
CHLORIDE SERPL-SCNC: 109 MMOL/L (ref 103–113)
CHOLEST SERPL-MCNC: 168 MG/DL
CO2 SERPL-SCNC: 28 MMOL/L (ref 21–32)
CREAT SERPL-MCNC: 0.8 MG/DL (ref 0.6–1)
DIFFERENTIAL METHOD BLD: ABNORMAL
EOSINOPHIL # BLD: 0.2 K/UL (ref 0–0.8)
EOSINOPHIL NFR BLD: 4 % (ref 0.5–7.8)
ERYTHROCYTE [DISTWIDTH] IN BLOOD BY AUTOMATED COUNT: 15.8 % (ref 11.9–14.6)
GLOBULIN SER CALC-MCNC: 3.8 G/DL (ref 2.8–4.5)
GLUCOSE SERPL-MCNC: 92 MG/DL (ref 65–100)
HCT VFR BLD AUTO: 40.7 % (ref 35.8–46.3)
HDLC SERPL-MCNC: 38 MG/DL (ref 40–60)
HDLC SERPL: 4.4
HGB BLD-MCNC: 12.6 G/DL (ref 11.7–15.4)
IMM GRANULOCYTES # BLD AUTO: 0 K/UL (ref 0–0.5)
IMM GRANULOCYTES NFR BLD AUTO: 0 % (ref 0–5)
LDLC SERPL CALC-MCNC: 113.4 MG/DL
LYMPHOCYTES # BLD: 1.8 K/UL (ref 0.5–4.6)
LYMPHOCYTES NFR BLD: 28 % (ref 13–44)
MCH RBC QN AUTO: 25.2 PG (ref 26.1–32.9)
MCHC RBC AUTO-ENTMCNC: 31 G/DL (ref 31.4–35)
MCV RBC AUTO: 81.4 FL (ref 82–102)
MONOCYTES # BLD: 0.5 K/UL (ref 0.1–1.3)
MONOCYTES NFR BLD: 8 % (ref 4–12)
NEUTS SEG # BLD: 3.8 K/UL (ref 1.7–8.2)
NEUTS SEG NFR BLD: 59 % (ref 43–78)
NRBC # BLD: 0 K/UL (ref 0–0.2)
PLATELET # BLD AUTO: 453 K/UL (ref 150–450)
PMV BLD AUTO: 9.1 FL (ref 9.4–12.3)
POTASSIUM SERPL-SCNC: 4.5 MMOL/L (ref 3.5–5.1)
PROT SERPL-MCNC: 7.7 G/DL (ref 6.3–8.2)
RBC # BLD AUTO: 5 M/UL (ref 4.05–5.2)
SODIUM SERPL-SCNC: 142 MMOL/L (ref 136–146)
TRIGL SERPL-MCNC: 83 MG/DL (ref 35–150)
TSH, 3RD GENERATION: 2.07 UIU/ML (ref 0.36–3.74)
VLDLC SERPL CALC-MCNC: 16.6 MG/DL (ref 6–23)
WBC # BLD AUTO: 6.4 K/UL (ref 4.3–11.1)

## 2024-02-16 PROCEDURE — 36415 COLL VENOUS BLD VENIPUNCTURE: CPT | Performed by: FAMILY MEDICINE

## 2024-02-20 ENCOUNTER — OFFICE VISIT (OUTPATIENT)
Dept: FAMILY MEDICINE CLINIC | Facility: CLINIC | Age: 39
End: 2024-02-20
Payer: COMMERCIAL

## 2024-02-20 VITALS
BODY MASS INDEX: 28.51 KG/M2 | DIASTOLIC BLOOD PRESSURE: 82 MMHG | HEIGHT: 61 IN | WEIGHT: 151 LBS | SYSTOLIC BLOOD PRESSURE: 122 MMHG | HEART RATE: 70 BPM

## 2024-02-20 DIAGNOSIS — G43.101 MIGRAINE WITH AURA AND WITH STATUS MIGRAINOSUS, NOT INTRACTABLE: ICD-10-CM

## 2024-02-20 DIAGNOSIS — Z13.31 SCREENING FOR DEPRESSION: ICD-10-CM

## 2024-02-20 DIAGNOSIS — E55.9 VITAMIN D DEFICIENCY: ICD-10-CM

## 2024-02-20 DIAGNOSIS — E66.3 OVERWEIGHT (BMI 25.0-29.9): ICD-10-CM

## 2024-02-20 DIAGNOSIS — E78.01 FAMILIAL HYPERCHOLESTEROLEMIA: ICD-10-CM

## 2024-02-20 DIAGNOSIS — F41.1 GENERALIZED ANXIETY DISORDER: ICD-10-CM

## 2024-02-20 DIAGNOSIS — Z00.00 ROUTINE GENERAL MEDICAL EXAMINATION AT A HEALTH CARE FACILITY: Primary | ICD-10-CM

## 2024-02-20 PROCEDURE — 99395 PREV VISIT EST AGE 18-39: CPT | Performed by: FAMILY MEDICINE

## 2024-02-20 RX ORDER — LIDOCAINE HCL 4 %
CREAM (GRAM) TOPICAL
COMMUNITY
Start: 2024-01-08

## 2024-02-20 ASSESSMENT — PATIENT HEALTH QUESTIONNAIRE - PHQ9
SUM OF ALL RESPONSES TO PHQ QUESTIONS 1-9: 0
2. FEELING DOWN, DEPRESSED OR HOPELESS: 0
SUM OF ALL RESPONSES TO PHQ9 QUESTIONS 1 & 2: 0
SUM OF ALL RESPONSES TO PHQ QUESTIONS 1-9: 0
1. LITTLE INTEREST OR PLEASURE IN DOING THINGS: 0
SUM OF ALL RESPONSES TO PHQ QUESTIONS 1-9: 0
SUM OF ALL RESPONSES TO PHQ QUESTIONS 1-9: 0

## 2024-02-20 NOTE — PROGRESS NOTES
PROGRESS NOTE    SUBJECTIVE:   Stormy Nash is a 38 y.o. female seen for a follow up visit regarding the following chief complaint:     Chief Complaint   Patient presents with    Annual Exam           HPI patient presents to the office today for complete physical without complaints      Past Medical History, Past Surgical History, Family history, Social History, and Medications were all reviewed with the patient today and updated as necessary.       Current Outpatient Medications   Medication Sig Dispense Refill    [employer] PAIN RELIEF 4 % CREA APLIQUE AL  ROLF AFECTADA JUANA VECES AL D A      Omega-3 1000 MG CAPS Take by mouth      medroxyPROGESTERone (PROVERA) 10 MG tablet 1 tablet      cyanocobalamin 1000 MCG tablet Take 1 tablet by mouth daily       No current facility-administered medications for this visit.     Allergies   Allergen Reactions    Sumatriptan Palpitations    Aspirin Swelling     Patient Active Problem List   Diagnosis    Palpitations    Dyspnea    Chest pain     Past Medical History:   Diagnosis Date    Kidney stone 11/2019    2nd one at present     Past Surgical History:   Procedure Laterality Date    BREAST REDUCTION SURGERY Bilateral 2017    DILATION AND CURETTAGE OF UTERUS  2014    HERNIA REPAIR  as infant     Family History   Problem Relation Age of Onset    Thyroid Disease Father     Hypertension Paternal Grandmother     Hypertension Father     Diabetes Mother     Hypertension Mother     Heart Disease Mother     Asthma Father     Diabetes Father      Social History     Tobacco Use    Smoking status: Never     Passive exposure: Never    Smokeless tobacco: Never   Substance Use Topics    Alcohol use: Never         Review of Systems      OBJECTIVE:  /82 (Site: Right Upper Arm, Position: Sitting, Cuff Size: Large Adult)   Pulse 70   Ht 1.549 m (5' 1\")   Wt 68.5 kg (151 lb)   BMI 28.53 kg/m²      Physical Exam     Medical problems and test results were reviewed with the patient

## 2024-12-10 ENCOUNTER — TRANSCRIBE ORDERS (OUTPATIENT)
Dept: SCHEDULING | Age: 39
End: 2024-12-10

## 2024-12-10 DIAGNOSIS — N39.0 URINARY TRACT INFECTION WITHOUT HEMATURIA, SITE UNSPECIFIED: Primary | ICD-10-CM

## 2024-12-16 ENCOUNTER — HOSPITAL ENCOUNTER (INPATIENT)
Age: 39
LOS: 3 days | Discharge: HOME | DRG: 660 | End: 2024-12-19
Attending: INTERNAL MEDICINE
Payer: COMMERCIAL

## 2024-12-16 ENCOUNTER — APPOINTMENT (OUTPATIENT)
Dept: CT IMAGING | Age: 39
DRG: 660 | End: 2024-12-16
Payer: COMMERCIAL

## 2024-12-16 ENCOUNTER — HOSPITAL ENCOUNTER (EMERGENCY)
Age: 39
Discharge: ANOTHER HOSPITAL | DRG: 660 | End: 2024-12-16
Attending: EMERGENCY MEDICINE
Payer: COMMERCIAL

## 2024-12-16 VITALS
BODY MASS INDEX: 28.32 KG/M2 | SYSTOLIC BLOOD PRESSURE: 138 MMHG | OXYGEN SATURATION: 99 % | RESPIRATION RATE: 17 BRPM | WEIGHT: 150 LBS | TEMPERATURE: 98 F | HEART RATE: 78 BPM | DIASTOLIC BLOOD PRESSURE: 93 MMHG | HEIGHT: 61 IN

## 2024-12-16 DIAGNOSIS — N13.2 URETERAL STONE WITH HYDRONEPHROSIS: Primary | ICD-10-CM

## 2024-12-16 DIAGNOSIS — N21.0 BLADDER STONE: Primary | ICD-10-CM

## 2024-12-16 DIAGNOSIS — N20.1 URETERIC STONE: ICD-10-CM

## 2024-12-16 PROBLEM — N20.0 NEPHROLITHIASIS: Status: ACTIVE | Noted: 2024-12-16

## 2024-12-16 LAB
ALBUMIN SERPL-MCNC: 4.8 G/DL (ref 3.5–5)
ALBUMIN/GLOB SERPL: 1.3 (ref 1–1.9)
ALP SERPL-CCNC: 93 U/L (ref 35–104)
ALT SERPL-CCNC: 10 U/L (ref 12–65)
ANION GAP SERPL CALC-SCNC: 13 MMOL/L (ref 7–16)
APPEARANCE UR: CLEAR
AST SERPL-CCNC: 18 U/L (ref 15–37)
BACTERIA URNS QL MICRO: 0 /HPF
BASOPHILS # BLD: 0.1 K/UL (ref 0–0.2)
BASOPHILS NFR BLD: 1 % (ref 0–2)
BILIRUB SERPL-MCNC: 0.3 MG/DL (ref 0–1.2)
BILIRUB UR QL: NEGATIVE
BUN SERPL-MCNC: 14 MG/DL (ref 6–23)
CALCIUM SERPL-MCNC: 10.1 MG/DL (ref 8.8–10.2)
CHLORIDE SERPL-SCNC: 102 MMOL/L (ref 98–107)
CO2 SERPL-SCNC: 24 MMOL/L (ref 20–29)
COLOR UR: YELLOW
CREAT SERPL-MCNC: 0.74 MG/DL (ref 0.8–1.3)
DIFFERENTIAL METHOD BLD: ABNORMAL
EOSINOPHIL # BLD: 0.2 K/UL (ref 0–0.8)
EOSINOPHIL NFR BLD: 2 % (ref 0.5–7.8)
EPI CELLS #/AREA URNS HPF: NORMAL /HPF
ERYTHROCYTE [DISTWIDTH] IN BLOOD BY AUTOMATED COUNT: 12.9 % (ref 11.9–14.6)
GLOBULIN SER CALC-MCNC: 3.6 G/DL (ref 2.3–3.5)
GLUCOSE SERPL-MCNC: 90 MG/DL (ref 65–100)
GLUCOSE UR STRIP.AUTO-MCNC: NEGATIVE MG/DL
HCG UR QL: NEGATIVE
HCT VFR BLD AUTO: 41.2 % (ref 35.8–46.3)
HGB BLD-MCNC: 13.6 G/DL (ref 11.7–15.4)
HGB UR QL STRIP: ABNORMAL
IMM GRANULOCYTES # BLD AUTO: 0 K/UL (ref 0–0.5)
IMM GRANULOCYTES NFR BLD AUTO: 0 % (ref 0–5)
KETONES UR QL STRIP.AUTO: NEGATIVE MG/DL
LEUKOCYTE ESTERASE UR QL STRIP.AUTO: NEGATIVE
LIPASE SERPL-CCNC: 30 U/L (ref 13–60)
LYMPHOCYTES # BLD: 2.9 K/UL (ref 0.5–4.6)
LYMPHOCYTES NFR BLD: 31 % (ref 13–44)
MCH RBC QN AUTO: 29.4 PG (ref 26.1–32.9)
MCHC RBC AUTO-ENTMCNC: 33 G/DL (ref 31.4–35)
MCV RBC AUTO: 89 FL (ref 82–102)
MONOCYTES # BLD: 0.6 K/UL (ref 0.1–1.3)
MONOCYTES NFR BLD: 6 % (ref 4–12)
MUCOUS THREADS URNS QL MICRO: 0 /LPF
NEUTS SEG # BLD: 5.6 K/UL (ref 1.7–8.2)
NEUTS SEG NFR BLD: 60 % (ref 43–78)
NITRITE UR QL STRIP.AUTO: NEGATIVE
NRBC # BLD: 0 K/UL (ref 0–0.2)
OTHER OBSERVATIONS: NORMAL
PH UR STRIP: 5.5 (ref 5–9)
PLATELET # BLD AUTO: 431 K/UL (ref 150–450)
PMV BLD AUTO: 8.8 FL (ref 9.4–12.3)
POTASSIUM SERPL-SCNC: 4.3 MMOL/L (ref 3.5–5.1)
PROT SERPL-MCNC: 8.4 G/DL (ref 6.3–8.2)
PROT UR STRIP-MCNC: NEGATIVE MG/DL
RBC # BLD AUTO: 4.63 M/UL (ref 4.05–5.2)
RBC #/AREA URNS HPF: NORMAL /HPF
SERVICE CMNT-IMP: NORMAL
SODIUM SERPL-SCNC: 139 MMOL/L (ref 133–143)
SP GR UR REFRACTOMETRY: >=1.03 (ref 1–1.02)
UROBILINOGEN UR QL STRIP.AUTO: 0.2 EU/DL (ref 0.2–1)
WBC # BLD AUTO: 9.4 K/UL (ref 4.3–11.1)
WBC URNS QL MICRO: NORMAL /HPF
WET PREP GENITAL: NORMAL

## 2024-12-16 PROCEDURE — 74176 CT ABD & PELVIS W/O CONTRAST: CPT

## 2024-12-16 PROCEDURE — 2580000003 HC RX 258

## 2024-12-16 PROCEDURE — 85025 COMPLETE CBC W/AUTO DIFF WBC: CPT

## 2024-12-16 PROCEDURE — 81025 URINE PREGNANCY TEST: CPT

## 2024-12-16 PROCEDURE — 81001 URINALYSIS AUTO W/SCOPE: CPT

## 2024-12-16 PROCEDURE — 80053 COMPREHEN METABOLIC PANEL: CPT

## 2024-12-16 PROCEDURE — 87210 SMEAR WET MOUNT SALINE/INK: CPT

## 2024-12-16 PROCEDURE — 83690 ASSAY OF LIPASE: CPT

## 2024-12-16 PROCEDURE — 6360000002 HC RX W HCPCS

## 2024-12-16 PROCEDURE — 1100000000 HC RM PRIVATE

## 2024-12-16 RX ORDER — UBIDECARENONE 75 MG
50 CAPSULE ORAL DAILY
Status: ON HOLD | COMMUNITY
End: 2024-12-19 | Stop reason: HOSPADM

## 2024-12-16 RX ORDER — MAGNESIUM SULFATE IN WATER 40 MG/ML
2000 INJECTION, SOLUTION INTRAVENOUS PRN
Status: DISCONTINUED | OUTPATIENT
Start: 2024-12-16 | End: 2024-12-19 | Stop reason: HOSPADM

## 2024-12-16 RX ORDER — ACETAMINOPHEN 650 MG/1
650 SUPPOSITORY RECTAL EVERY 6 HOURS PRN
Status: DISCONTINUED | OUTPATIENT
Start: 2024-12-16 | End: 2024-12-19 | Stop reason: HOSPADM

## 2024-12-16 RX ORDER — MAGNESIUM HYDROXIDE/ALUMINUM HYDROXICE/SIMETHICONE 120; 1200; 1200 MG/30ML; MG/30ML; MG/30ML
30 SUSPENSION ORAL EVERY 6 HOURS PRN
Status: DISCONTINUED | OUTPATIENT
Start: 2024-12-16 | End: 2024-12-19 | Stop reason: HOSPADM

## 2024-12-16 RX ORDER — SODIUM CHLORIDE 0.9 % (FLUSH) 0.9 %
5-40 SYRINGE (ML) INJECTION EVERY 12 HOURS SCHEDULED
Status: DISCONTINUED | OUTPATIENT
Start: 2024-12-16 | End: 2024-12-19 | Stop reason: HOSPADM

## 2024-12-16 RX ORDER — SODIUM CHLORIDE 9 MG/ML
INJECTION, SOLUTION INTRAVENOUS PRN
Status: DISCONTINUED | OUTPATIENT
Start: 2024-12-16 | End: 2024-12-19 | Stop reason: HOSPADM

## 2024-12-16 RX ORDER — POTASSIUM CHLORIDE 7.45 MG/ML
10 INJECTION INTRAVENOUS PRN
Status: DISCONTINUED | OUTPATIENT
Start: 2024-12-16 | End: 2024-12-16 | Stop reason: SDUPTHER

## 2024-12-16 RX ORDER — BISACODYL 10 MG
10 SUPPOSITORY, RECTAL RECTAL DAILY PRN
Status: DISCONTINUED | OUTPATIENT
Start: 2024-12-16 | End: 2024-12-19 | Stop reason: HOSPADM

## 2024-12-16 RX ORDER — POTASSIUM CHLORIDE 7.45 MG/ML
10 INJECTION INTRAVENOUS PRN
Status: DISCONTINUED | OUTPATIENT
Start: 2024-12-16 | End: 2024-12-19 | Stop reason: HOSPADM

## 2024-12-16 RX ORDER — POLYETHYLENE GLYCOL 3350 17 G/17G
17 POWDER, FOR SOLUTION ORAL DAILY PRN
Status: DISCONTINUED | OUTPATIENT
Start: 2024-12-16 | End: 2024-12-19 | Stop reason: HOSPADM

## 2024-12-16 RX ORDER — ONDANSETRON 2 MG/ML
4 INJECTION INTRAMUSCULAR; INTRAVENOUS EVERY 6 HOURS PRN
Status: DISCONTINUED | OUTPATIENT
Start: 2024-12-16 | End: 2024-12-19 | Stop reason: HOSPADM

## 2024-12-16 RX ORDER — POTASSIUM CHLORIDE 1500 MG/1
40 TABLET, EXTENDED RELEASE ORAL PRN
Status: DISCONTINUED | OUTPATIENT
Start: 2024-12-16 | End: 2024-12-19 | Stop reason: HOSPADM

## 2024-12-16 RX ORDER — ONDANSETRON 4 MG/1
4 TABLET, ORALLY DISINTEGRATING ORAL EVERY 8 HOURS PRN
Status: DISCONTINUED | OUTPATIENT
Start: 2024-12-16 | End: 2024-12-19 | Stop reason: HOSPADM

## 2024-12-16 RX ORDER — ACETAMINOPHEN 325 MG/1
650 TABLET ORAL EVERY 6 HOURS PRN
Status: DISCONTINUED | OUTPATIENT
Start: 2024-12-16 | End: 2024-12-19 | Stop reason: HOSPADM

## 2024-12-16 RX ORDER — SODIUM CHLORIDE 0.9 % (FLUSH) 0.9 %
5-40 SYRINGE (ML) INJECTION PRN
Status: DISCONTINUED | OUTPATIENT
Start: 2024-12-16 | End: 2024-12-19 | Stop reason: HOSPADM

## 2024-12-16 RX ORDER — FAMOTIDINE 20 MG/1
10 TABLET, FILM COATED ORAL DAILY PRN
Status: DISCONTINUED | OUTPATIENT
Start: 2024-12-16 | End: 2024-12-19 | Stop reason: HOSPADM

## 2024-12-16 RX ADMIN — SODIUM CHLORIDE, PRESERVATIVE FREE 10 ML: 5 INJECTION INTRAVENOUS at 22:43

## 2024-12-16 RX ADMIN — WATER 1000 MG: 1 INJECTION INTRAMUSCULAR; INTRAVENOUS; SUBCUTANEOUS at 22:43

## 2024-12-16 ASSESSMENT — PAIN - FUNCTIONAL ASSESSMENT: PAIN_FUNCTIONAL_ASSESSMENT: 0-10

## 2024-12-16 ASSESSMENT — PAIN SCALES - GENERAL: PAINLEVEL_OUTOF10: 8

## 2024-12-16 ASSESSMENT — PAIN DESCRIPTION - LOCATION: LOCATION: FLANK;PELVIS

## 2024-12-16 NOTE — ED PROVIDER NOTES
Emergency Department Provider Note       PCP: Darío Muhammad DO   Age: 39 y.o.   Sex: female     DISPOSITION Decision To Transfer 12/16/2024 06:34:13 PM    ICD-10-CM    1. Bladder stone  N21.0       2. Ureteric stone  N20.1           Medical Decision Making     English is patient's second language but she refused an .  Patient has no flank pain.  She is having dysuria and frequency.  Initially just urine was ordered along with a vaginal swab.  Both of which were negative.  Lab work was added.    No elevated white count so we will do CT urogram.  CT urogram shows 2 mm left ureter stone and a 5 mm bladder stone.  This is likely the cause of her pain.  She is unable to pass the stone.  Discussed with patient need to follow-up as an outpatient but [place] is leaving the country on [place].  Discussed with urology.    Urology will take pt to OR in a.m.  Asked hospitalist to admit.  Accepted by Dr. Mckeon.  Pt refuses to go by ambulance.  Her brother is on his way to take her.  Discussed with her at length the importance of driving directly to the hospital and checking in.  Will leave IV in place.  Discussed with her that she cannot stop for clothes or food and needs to go directly to the hospital to check in.     1 or more acute illnesses that pose a threat to life or bodily function.   Prescription drug management performed.  Discussion with external consultants.  Shared medical decision making was utilized in creating the patients health plan today.  I independently ordered and reviewed each unique test.    I reviewed external records: previous lab results from outside ED.       I interpreted the CT Scan large stone in bladder.    The patient was admitted and I have discussed patient management with the admitting provider.  The management of this patient was discussed with an external consultant.  Mike Stubbs    Exclusion criteria - the patient is NOT to be included for SEP-1 Core Measure due to:

## 2024-12-16 NOTE — ED TRIAGE NOTES
Pt ambulatory to triage for reports of dysuria & pain. Pt states she was told to come to ER for evaluation of kidney stones by her doctor. Pt states her symptoms have been intermittent for the past week but worsening today. Pt denies any n/v. Pt with lab work results with her, potassium of 5.3 noted on labs but pt states doctor did not specifically say anything about potassium levels. Pt states she has hx of kidney stones, stating symptoms are worse than previous episodes.

## 2024-12-17 ENCOUNTER — ANESTHESIA EVENT (OUTPATIENT)
Dept: SURGERY | Age: 39
End: 2024-12-17
Payer: COMMERCIAL

## 2024-12-17 ENCOUNTER — APPOINTMENT (OUTPATIENT)
Dept: GENERAL RADIOLOGY | Age: 39
DRG: 660 | End: 2024-12-17
Attending: INTERNAL MEDICINE
Payer: COMMERCIAL

## 2024-12-17 ENCOUNTER — ANESTHESIA (OUTPATIENT)
Dept: SURGERY | Age: 39
End: 2024-12-17
Payer: COMMERCIAL

## 2024-12-17 PROBLEM — N17.9 AKI (ACUTE KIDNEY INJURY) (HCC): Status: ACTIVE | Noted: 2024-12-17

## 2024-12-17 LAB
ANION GAP SERPL CALC-SCNC: 9 MMOL/L (ref 7–16)
BASOPHILS # BLD: 0 K/UL (ref 0–0.2)
BASOPHILS NFR BLD: 1 % (ref 0–2)
BUN SERPL-MCNC: 16 MG/DL (ref 6–23)
CALCIUM SERPL-MCNC: 9.2 MG/DL (ref 8.8–10.2)
CHLORIDE SERPL-SCNC: 108 MMOL/L (ref 98–107)
CO2 SERPL-SCNC: 24 MMOL/L (ref 20–29)
CREAT SERPL-MCNC: 1.19 MG/DL (ref 0.6–1.1)
DIFFERENTIAL METHOD BLD: ABNORMAL
EOSINOPHIL # BLD: 0.2 K/UL (ref 0–0.8)
EOSINOPHIL NFR BLD: 2 % (ref 0.5–7.8)
ERYTHROCYTE [DISTWIDTH] IN BLOOD BY AUTOMATED COUNT: 12.9 % (ref 11.9–14.6)
GLUCOSE SERPL-MCNC: 97 MG/DL (ref 70–99)
HCT VFR BLD AUTO: 40.3 % (ref 35.8–46.3)
HGB BLD-MCNC: 13.1 G/DL (ref 11.7–15.4)
IMM GRANULOCYTES # BLD AUTO: 0 K/UL (ref 0–0.5)
IMM GRANULOCYTES NFR BLD AUTO: 0 % (ref 0–5)
LYMPHOCYTES # BLD: 2.3 K/UL (ref 0.5–4.6)
LYMPHOCYTES NFR BLD: 32 % (ref 13–44)
MCH RBC QN AUTO: 28.8 PG (ref 26.1–32.9)
MCHC RBC AUTO-ENTMCNC: 32.5 G/DL (ref 31.4–35)
MCV RBC AUTO: 88.6 FL (ref 82–102)
MONOCYTES # BLD: 0.5 K/UL (ref 0.1–1.3)
MONOCYTES NFR BLD: 8 % (ref 4–12)
NEUTS SEG # BLD: 4 K/UL (ref 1.7–8.2)
NEUTS SEG NFR BLD: 57 % (ref 43–78)
NRBC # BLD: 0 K/UL (ref 0–0.2)
PLATELET # BLD AUTO: 369 K/UL (ref 150–450)
PMV BLD AUTO: 8.9 FL (ref 9.4–12.3)
POTASSIUM SERPL-SCNC: 4.5 MMOL/L (ref 3.5–5.1)
RBC # BLD AUTO: 4.55 M/UL (ref 4.05–5.2)
SODIUM SERPL-SCNC: 141 MMOL/L (ref 136–145)
WBC # BLD AUTO: 7 K/UL (ref 4.3–11.1)

## 2024-12-17 PROCEDURE — 85025 COMPLETE CBC W/AUTO DIFF WBC: CPT

## 2024-12-17 PROCEDURE — 6360000002 HC RX W HCPCS

## 2024-12-17 PROCEDURE — 2709999900 HC NON-CHARGEABLE SUPPLY: Performed by: UROLOGY

## 2024-12-17 PROCEDURE — 6360000002 HC RX W HCPCS: Performed by: SURGERY

## 2024-12-17 PROCEDURE — 7100000000 HC PACU RECOVERY - FIRST 15 MIN: Performed by: UROLOGY

## 2024-12-17 PROCEDURE — 6370000000 HC RX 637 (ALT 250 FOR IP): Performed by: FAMILY MEDICINE

## 2024-12-17 PROCEDURE — 2500000003 HC RX 250 WO HCPCS

## 2024-12-17 PROCEDURE — 94761 N-INVAS EAR/PLS OXIMETRY MLT: CPT

## 2024-12-17 PROCEDURE — 6370000000 HC RX 637 (ALT 250 FOR IP)

## 2024-12-17 PROCEDURE — 3700000000 HC ANESTHESIA ATTENDED CARE: Performed by: UROLOGY

## 2024-12-17 PROCEDURE — BT1F1ZZ FLUOROSCOPY OF LEFT KIDNEY, URETER AND BLADDER USING LOW OSMOLAR CONTRAST: ICD-10-PCS | Performed by: UROLOGY

## 2024-12-17 PROCEDURE — 2700000000 HC OXYGEN THERAPY PER DAY

## 2024-12-17 PROCEDURE — 0TC68ZZ EXTIRPATION OF MATTER FROM RIGHT URETER, VIA NATURAL OR ARTIFICIAL OPENING ENDOSCOPIC: ICD-10-PCS | Performed by: UROLOGY

## 2024-12-17 PROCEDURE — 51798 US URINE CAPACITY MEASURE: CPT

## 2024-12-17 PROCEDURE — 52005 CYSTO W/URTRL CATHJ: CPT | Performed by: UROLOGY

## 2024-12-17 PROCEDURE — C1769 GUIDE WIRE: HCPCS | Performed by: UROLOGY

## 2024-12-17 PROCEDURE — 3600000004 HC SURGERY LEVEL 4 BASE: Performed by: UROLOGY

## 2024-12-17 PROCEDURE — 99222 1ST HOSP IP/OBS MODERATE 55: CPT | Performed by: NURSE PRACTITIONER

## 2024-12-17 PROCEDURE — 2580000003 HC RX 258

## 2024-12-17 PROCEDURE — 88300 SURGICAL PATH GROSS: CPT

## 2024-12-17 PROCEDURE — 74420 UROGRAPHY RTRGR +-KUB: CPT | Performed by: UROLOGY

## 2024-12-17 PROCEDURE — 1100000000 HC RM PRIVATE

## 2024-12-17 PROCEDURE — 36415 COLL VENOUS BLD VENIPUNCTURE: CPT

## 2024-12-17 PROCEDURE — C2617 STENT, NON-COR, TEM W/O DEL: HCPCS | Performed by: UROLOGY

## 2024-12-17 PROCEDURE — 52332 CYSTOSCOPY AND TREATMENT: CPT | Performed by: UROLOGY

## 2024-12-17 PROCEDURE — 80048 BASIC METABOLIC PNL TOTAL CA: CPT

## 2024-12-17 PROCEDURE — 82355 CALCULUS ANALYSIS QUAL: CPT

## 2024-12-17 PROCEDURE — 7100000001 HC PACU RECOVERY - ADDTL 15 MIN: Performed by: UROLOGY

## 2024-12-17 PROCEDURE — 3600000014 HC SURGERY LEVEL 4 ADDTL 15MIN: Performed by: UROLOGY

## 2024-12-17 PROCEDURE — 0T778DZ DILATION OF LEFT URETER WITH INTRALUMINAL DEVICE, VIA NATURAL OR ARTIFICIAL OPENING ENDOSCOPIC: ICD-10-PCS | Performed by: UROLOGY

## 2024-12-17 PROCEDURE — 3700000001 HC ADD 15 MINUTES (ANESTHESIA): Performed by: UROLOGY

## 2024-12-17 DEVICE — URETERAL STENT
Type: IMPLANTABLE DEVICE | Site: URETER | Status: FUNCTIONAL
Brand: PERCUFLEX™ PLUS

## 2024-12-17 RX ORDER — MIDAZOLAM HYDROCHLORIDE 1 MG/ML
INJECTION, SOLUTION INTRAMUSCULAR; INTRAVENOUS
Status: DISPENSED
Start: 2024-12-17 | End: 2024-12-18

## 2024-12-17 RX ORDER — PROPOFOL 10 MG/ML
INJECTION, EMULSION INTRAVENOUS
Status: DISCONTINUED | OUTPATIENT
Start: 2024-12-17 | End: 2024-12-17 | Stop reason: SDUPTHER

## 2024-12-17 RX ORDER — PROCHLORPERAZINE EDISYLATE 5 MG/ML
5 INJECTION INTRAMUSCULAR; INTRAVENOUS
Status: DISCONTINUED | OUTPATIENT
Start: 2024-12-17 | End: 2024-12-17 | Stop reason: HOSPADM

## 2024-12-17 RX ORDER — IPRATROPIUM BROMIDE AND ALBUTEROL SULFATE 2.5; .5 MG/3ML; MG/3ML
1 SOLUTION RESPIRATORY (INHALATION)
Status: DISCONTINUED | OUTPATIENT
Start: 2024-12-17 | End: 2024-12-17 | Stop reason: HOSPADM

## 2024-12-17 RX ORDER — NEOSTIGMINE METHYLSULFATE 1 MG/ML
INJECTION INTRAVENOUS
Status: DISCONTINUED | OUTPATIENT
Start: 2024-12-17 | End: 2024-12-17 | Stop reason: SDUPTHER

## 2024-12-17 RX ORDER — LIDOCAINE HYDROCHLORIDE 10 MG/ML
1 INJECTION, SOLUTION INFILTRATION; PERINEURAL
Status: CANCELLED | OUTPATIENT
Start: 2024-12-17 | End: 2024-12-18

## 2024-12-17 RX ORDER — SODIUM CHLORIDE, SODIUM LACTATE, POTASSIUM CHLORIDE, CALCIUM CHLORIDE 600; 310; 30; 20 MG/100ML; MG/100ML; MG/100ML; MG/100ML
INJECTION, SOLUTION INTRAVENOUS CONTINUOUS
Status: CANCELLED | OUTPATIENT
Start: 2024-12-17

## 2024-12-17 RX ORDER — GLYCOPYRROLATE 0.2 MG/ML
INJECTION INTRAMUSCULAR; INTRAVENOUS
Status: DISCONTINUED | OUTPATIENT
Start: 2024-12-17 | End: 2024-12-17 | Stop reason: SDUPTHER

## 2024-12-17 RX ORDER — FENTANYL CITRATE 50 UG/ML
INJECTION, SOLUTION INTRAMUSCULAR; INTRAVENOUS
Status: DISCONTINUED | OUTPATIENT
Start: 2024-12-17 | End: 2024-12-17 | Stop reason: SDUPTHER

## 2024-12-17 RX ORDER — SODIUM CHLORIDE 0.9 % (FLUSH) 0.9 %
5-40 SYRINGE (ML) INJECTION EVERY 12 HOURS SCHEDULED
Status: CANCELLED | OUTPATIENT
Start: 2024-12-17

## 2024-12-17 RX ORDER — HALOPERIDOL 5 MG/ML
1 INJECTION INTRAMUSCULAR
Status: DISCONTINUED | OUTPATIENT
Start: 2024-12-17 | End: 2024-12-17 | Stop reason: HOSPADM

## 2024-12-17 RX ORDER — DEXAMETHASONE SODIUM PHOSPHATE 4 MG/ML
INJECTION, SOLUTION INTRA-ARTICULAR; INTRALESIONAL; INTRAMUSCULAR; INTRAVENOUS; SOFT TISSUE
Status: DISCONTINUED | OUTPATIENT
Start: 2024-12-17 | End: 2024-12-17 | Stop reason: SDUPTHER

## 2024-12-17 RX ORDER — HYDROMORPHONE HYDROCHLORIDE 2 MG/ML
0.5 INJECTION, SOLUTION INTRAMUSCULAR; INTRAVENOUS; SUBCUTANEOUS EVERY 5 MIN PRN
Status: DISCONTINUED | OUTPATIENT
Start: 2024-12-17 | End: 2024-12-17 | Stop reason: HOSPADM

## 2024-12-17 RX ORDER — ROCURONIUM BROMIDE 10 MG/ML
INJECTION, SOLUTION INTRAVENOUS
Status: DISCONTINUED | OUTPATIENT
Start: 2024-12-17 | End: 2024-12-17 | Stop reason: SDUPTHER

## 2024-12-17 RX ORDER — SODIUM CHLORIDE, SODIUM LACTATE, POTASSIUM CHLORIDE, CALCIUM CHLORIDE 600; 310; 30; 20 MG/100ML; MG/100ML; MG/100ML; MG/100ML
INJECTION, SOLUTION INTRAVENOUS
Status: DISCONTINUED | OUTPATIENT
Start: 2024-12-17 | End: 2024-12-17 | Stop reason: SDUPTHER

## 2024-12-17 RX ORDER — LABETALOL HYDROCHLORIDE 5 MG/ML
10 INJECTION, SOLUTION INTRAVENOUS
Status: DISCONTINUED | OUTPATIENT
Start: 2024-12-17 | End: 2024-12-17 | Stop reason: HOSPADM

## 2024-12-17 RX ORDER — SODIUM CHLORIDE 9 MG/ML
INJECTION, SOLUTION INTRAVENOUS PRN
Status: CANCELLED | OUTPATIENT
Start: 2024-12-17

## 2024-12-17 RX ORDER — SUCCINYLCHOLINE CHLORIDE 20 MG/ML
INJECTION INTRAMUSCULAR; INTRAVENOUS
Status: DISCONTINUED | OUTPATIENT
Start: 2024-12-17 | End: 2024-12-17 | Stop reason: SDUPTHER

## 2024-12-17 RX ORDER — SCOLOPAMINE TRANSDERMAL SYSTEM 1 MG/1
1 PATCH, EXTENDED RELEASE TRANSDERMAL
Status: CANCELLED | OUTPATIENT
Start: 2024-12-17

## 2024-12-17 RX ORDER — LIDOCAINE HYDROCHLORIDE 20 MG/ML
INJECTION, SOLUTION EPIDURAL; INFILTRATION; INTRACAUDAL; PERINEURAL
Status: DISCONTINUED | OUTPATIENT
Start: 2024-12-17 | End: 2024-12-17 | Stop reason: SDUPTHER

## 2024-12-17 RX ORDER — HYDROMORPHONE HYDROCHLORIDE 2 MG/1
2 TABLET ORAL EVERY 4 HOURS PRN
Status: DISCONTINUED | OUTPATIENT
Start: 2024-12-17 | End: 2024-12-19 | Stop reason: HOSPADM

## 2024-12-17 RX ORDER — SODIUM CHLORIDE 0.9 % (FLUSH) 0.9 %
5-40 SYRINGE (ML) INJECTION PRN
Status: CANCELLED | OUTPATIENT
Start: 2024-12-17

## 2024-12-17 RX ORDER — OXYCODONE HYDROCHLORIDE 5 MG/1
5 TABLET ORAL
Status: DISCONTINUED | OUTPATIENT
Start: 2024-12-17 | End: 2024-12-17 | Stop reason: HOSPADM

## 2024-12-17 RX ORDER — HYDROMORPHONE HYDROCHLORIDE 2 MG/1
1 TABLET ORAL EVERY 4 HOURS PRN
Status: DISCONTINUED | OUTPATIENT
Start: 2024-12-17 | End: 2024-12-19 | Stop reason: HOSPADM

## 2024-12-17 RX ORDER — PHENAZOPYRIDINE HYDROCHLORIDE 95 MG/1
95 TABLET ORAL 3 TIMES DAILY PRN
Status: DISCONTINUED | OUTPATIENT
Start: 2024-12-17 | End: 2024-12-18

## 2024-12-17 RX ORDER — ONDANSETRON 2 MG/ML
INJECTION INTRAMUSCULAR; INTRAVENOUS
Status: DISCONTINUED | OUTPATIENT
Start: 2024-12-17 | End: 2024-12-17 | Stop reason: SDUPTHER

## 2024-12-17 RX ORDER — NALOXONE HYDROCHLORIDE 0.4 MG/ML
INJECTION, SOLUTION INTRAMUSCULAR; INTRAVENOUS; SUBCUTANEOUS PRN
Status: DISCONTINUED | OUTPATIENT
Start: 2024-12-17 | End: 2024-12-17 | Stop reason: HOSPADM

## 2024-12-17 RX ADMIN — PROPOFOL 200 MG: 10 INJECTION, EMULSION INTRAVENOUS at 15:30

## 2024-12-17 RX ADMIN — ACETAMINOPHEN 650 MG: 325 TABLET ORAL at 09:30

## 2024-12-17 RX ADMIN — WATER 1000 MG: 1 INJECTION INTRAMUSCULAR; INTRAVENOUS; SUBCUTANEOUS at 05:47

## 2024-12-17 RX ADMIN — ONDANSETRON 4 MG: 2 INJECTION INTRAMUSCULAR; INTRAVENOUS at 15:38

## 2024-12-17 RX ADMIN — WATER 1000 MG: 1 INJECTION INTRAMUSCULAR; INTRAVENOUS; SUBCUTANEOUS at 21:51

## 2024-12-17 RX ADMIN — HYDROMORPHONE HYDROCHLORIDE 2 MG: 2 TABLET ORAL at 23:23

## 2024-12-17 RX ADMIN — FAMOTIDINE 10 MG: 20 TABLET, FILM COATED ORAL at 19:51

## 2024-12-17 RX ADMIN — PHENAZOPYRIDINE HYDROCHLORIDE 95 MG: 95 TABLET ORAL at 19:52

## 2024-12-17 RX ADMIN — ROCURONIUM BROMIDE 15 MG: 10 INJECTION, SOLUTION INTRAVENOUS at 15:30

## 2024-12-17 RX ADMIN — SODIUM CHLORIDE, SODIUM LACTATE, POTASSIUM CHLORIDE, AND CALCIUM CHLORIDE: 600; 310; 30; 20 INJECTION, SOLUTION INTRAVENOUS at 15:30

## 2024-12-17 RX ADMIN — HYDROMORPHONE HYDROCHLORIDE 1 MG: 2 TABLET ORAL at 05:44

## 2024-12-17 RX ADMIN — HYDROMORPHONE HYDROCHLORIDE 1 MG: 2 TABLET ORAL at 19:48

## 2024-12-17 RX ADMIN — Medication 120 MG: at 15:30

## 2024-12-17 RX ADMIN — NEOSTIGMINE METHYLSULFATE 4 MG: 1 INJECTION INTRAVENOUS at 16:17

## 2024-12-17 RX ADMIN — HYDROMORPHONE HYDROCHLORIDE 0.5 MG: 2 INJECTION INTRAMUSCULAR; INTRAVENOUS; SUBCUTANEOUS at 17:14

## 2024-12-17 RX ADMIN — HYDROMORPHONE HYDROCHLORIDE 0.5 MG: 2 INJECTION INTRAMUSCULAR; INTRAVENOUS; SUBCUTANEOUS at 16:53

## 2024-12-17 RX ADMIN — PHENAZOPYRIDINE HYDROCHLORIDE 95 MG: 95 TABLET ORAL at 06:11

## 2024-12-17 RX ADMIN — WATER 1000 MG: 1 INJECTION INTRAMUSCULAR; INTRAVENOUS; SUBCUTANEOUS at 14:09

## 2024-12-17 RX ADMIN — HYDROMORPHONE HYDROCHLORIDE 0.5 MG: 2 INJECTION INTRAMUSCULAR; INTRAVENOUS; SUBCUTANEOUS at 17:04

## 2024-12-17 RX ADMIN — ONDANSETRON 4 MG: 2 INJECTION INTRAMUSCULAR; INTRAVENOUS at 19:48

## 2024-12-17 RX ADMIN — FENTANYL CITRATE 50 MCG: 50 INJECTION, SOLUTION INTRAMUSCULAR; INTRAVENOUS at 15:56

## 2024-12-17 RX ADMIN — GLYCOPYRROLATE 0.6 MG: 0.2 INJECTION INTRAMUSCULAR; INTRAVENOUS at 16:17

## 2024-12-17 RX ADMIN — PHENAZOPYRIDINE HYDROCHLORIDE 95 MG: 95 TABLET ORAL at 14:13

## 2024-12-17 RX ADMIN — DEXAMETHASONE SODIUM PHOSPHATE 10 MG: 4 INJECTION INTRA-ARTICULAR; INTRALESIONAL; INTRAMUSCULAR; INTRAVENOUS; SOFT TISSUE at 15:38

## 2024-12-17 RX ADMIN — SODIUM CHLORIDE, PRESERVATIVE FREE 10 ML: 5 INJECTION INTRAVENOUS at 19:55

## 2024-12-17 RX ADMIN — LIDOCAINE HYDROCHLORIDE 100 MG: 20 INJECTION, SOLUTION EPIDURAL; INFILTRATION; INTRACAUDAL; PERINEURAL at 15:30

## 2024-12-17 RX ADMIN — Medication 3 MG: at 21:52

## 2024-12-17 RX ADMIN — ROCURONIUM BROMIDE 10 MG: 10 INJECTION, SOLUTION INTRAVENOUS at 16:01

## 2024-12-17 RX ADMIN — FENTANYL CITRATE 50 MCG: 50 INJECTION, SOLUTION INTRAMUSCULAR; INTRAVENOUS at 15:40

## 2024-12-17 ASSESSMENT — PAIN DESCRIPTION - ONSET
ONSET: GRADUAL
ONSET: ON-GOING

## 2024-12-17 ASSESSMENT — PAIN SCALES - GENERAL
PAINLEVEL_OUTOF10: 0
PAINLEVEL_OUTOF10: 3
PAINLEVEL_OUTOF10: 10
PAINLEVEL_OUTOF10: 7
PAINLEVEL_OUTOF10: 6
PAINLEVEL_OUTOF10: 5
PAINLEVEL_OUTOF10: 0
PAINLEVEL_OUTOF10: 4
PAINLEVEL_OUTOF10: 0

## 2024-12-17 ASSESSMENT — PAIN DESCRIPTION - LOCATION
LOCATION: OTHER (COMMENT);FLANK
LOCATION: ABDOMEN
LOCATION: OTHER (COMMENT)
LOCATION: PERINEUM
LOCATION: ABDOMEN
LOCATION: HEAD

## 2024-12-17 ASSESSMENT — PAIN DESCRIPTION - FREQUENCY
FREQUENCY: INTERMITTENT
FREQUENCY: INTERMITTENT

## 2024-12-17 ASSESSMENT — PAIN DESCRIPTION - ORIENTATION
ORIENTATION: LOWER
ORIENTATION: LOWER
ORIENTATION: MID;RIGHT;LEFT
ORIENTATION: LOWER

## 2024-12-17 ASSESSMENT — PAIN - FUNCTIONAL ASSESSMENT
PAIN_FUNCTIONAL_ASSESSMENT: 0-10
PAIN_FUNCTIONAL_ASSESSMENT: PREVENTS OR INTERFERES SOME ACTIVE ACTIVITIES AND ADLS
PAIN_FUNCTIONAL_ASSESSMENT: PREVENTS OR INTERFERES SOME ACTIVE ACTIVITIES AND ADLS

## 2024-12-17 ASSESSMENT — PAIN DESCRIPTION - DESCRIPTORS
DESCRIPTORS: DISCOMFORT;BURNING
DESCRIPTORS: ACHING;CRAMPING;SORE;BURNING
DESCRIPTORS: ACHING;DISCOMFORT;SORE

## 2024-12-17 ASSESSMENT — PAIN DESCRIPTION - PAIN TYPE
TYPE: ACUTE PAIN;SURGICAL PAIN
TYPE: ACUTE PAIN;SURGICAL PAIN

## 2024-12-17 NOTE — CONSULTS
Urology Consult    Requesting MD:     Patient: Stormy Nash MRN: [id number]  SSN: [id number]    YOB: 1985  Age: 39 y.o.  Sex: female      Subjective:      Stormy Nash is a 39 y.o. female who with medical history of occasional who presented to [place] emergency department with complaints of dysuria and back pain.  Dysuria has been going on for approximately 2 weeks, has gotten worse over the preceding 48 hours.  She was seen by her primary care provider for workup of a possible kidney stone.  Her blood work at that time was essentially normal, he recommended that she come to the emergency department if symptoms do not get better.  They have not.     Vital signs were significant for hypertension on arrival otherwise grossly stable hypertension was 150/109.    Urology consult for obstructed uretal stone.    Patient seen at bedside. Reports bladder pain and burning. She is leaving to go out of the country on Tuesday.     Past Medical History:   Diagnosis Date    Kidney stone 11/2019    2nd one at present     Past Surgical History:   Procedure Laterality Date    BREAST REDUCTION SURGERY Bilateral 2017    DILATION AND CURETTAGE OF UTERUS  2014    HERNIA REPAIR  as infant      Family History   Problem Relation Age of Onset    Thyroid Disease Father     Hypertension Paternal Grandmother     Hypertension Father     Diabetes Mother     Hypertension Mother     Heart Disease Mother     Asthma Father     Diabetes Father      Social History     Tobacco Use    Smoking status: Never     Passive exposure: Never    Smokeless tobacco: Never   Substance Use Topics    Alcohol use: Never      Prior to Admission medications    Medication Sig Start Date End Date Taking? Authorizing Provider   vitamin B-12 (CYANOCOBALAMIN) 100 MCG tablet Take 0.5 tablets by mouth daily   Yes Kristen Moctezuma MD   Omega-3 1000 MG CAPS Take by mouth   Yes Kristen Moctezuma MD   medroxyPROGESTERone (PROVERA) 10  performed at this facility use one or all of  the following: Automated exposure control, adjustment of the mA and/or kVp  according to patient's size, iterative reconstruction.     COMPARISON: None     FINDINGS:  - KIDNEYS/URETERS: There is a 4 mm left nonobstructive mid renal calculus. There  is a 2 mm left lower pole nonobstructive calculus. There is a 2 mm proximal left  ureteric calculus with minimal left hydroureteronephrosis. There is a 2 mm  nonobstructive right lower pole renal calculus. A 5 mm calculus is noted in the  dependent portion of the bladder.  - KIDNEY OR URETERAL STONE [place] UNIT:  - BLADDER: Normal.     - LUNG BASES: No infiltrates or masses.  - LIVER: Normal in size and appearance.    - GALLBLADDER/BILE DUCTS: No gallstones or bile duct dilatation.  - PANCREAS: Normal.  - SPLEEN: Normal.  - ADRENALS: Normal.     - REPRODUCTIVE ORGANS: Low-attenuation changes right adnexa potentially a cyst  versus follicles.  - BOWEL: Normal caliber.  No inflammatory changes.  - LYMPH NODES: No significant retroperitoneal, mesenteric, or pelvic adenopathy.  - BONES: No fracture or significant bone lesion.  - VASCULATURE: Normal  - OTHER: No ascites. Potential small umbilical hernia. Foci of soft tissue edema  in the anterior abdominal wall, nonspecific in nature.     IMPRESSION:  1. There is a 2 mm left proximal ureteric calculus with minimal left-sided  hydroureteronephrosis of the proximal ureter. Multiple nonobstructive bilateral  renal calculi are also present. Furthermore however a 5 mm calculus is noted in  the dependent portion of the bladder. Correlation recommended. Additional  details are in the body report. Please see the entire body of report.            Plan:     Medical management per primary team.  NPO.  Will schedule CLULLS with Dr. Doan later today.  Ok to D/C after procedure.    Signed By: LORETTA Hernandez     December 17, 2024        I have seen and examined this patient.  I have

## 2024-12-17 NOTE — DISCHARGE INSTRUCTIONS
If you have had surgery in the past 7-10 days by one of our providers and are having fever, bleeding, or drainage from an incision, have an opening in an incision, or having issues urinating properly, please call [phone] during normal office hours. Please call [phone] for any immediate needs AFTER HOURS.     Ureteral Stent Placement: What to Expect at Home  Your Recovery  A ureteral (say \"you-REE-ter-ul\") stent is a thin, hollow tube that is placed in the ureter to help urine pass from the kidney into the bladder. Ureters are the tubes that connect the kidneys to the bladder.  You may have a small amount of blood in your urine for 1 to 3 days after the procedure.  While the stent is in place, you may have to urinate more often, feel a sudden need to urinate, or feel like you cannot completely empty your bladder. You may feel some pain when you urinate or do strenuous activity. You also may notice a small amount of blood in your urine after strenuous activities. These side effects usually do not prevent people from doing their normal daily activities. You may have a string attached to your stent. Do not to pull the string unless the doctor tells you to. The doctor will use the string to pull out the stent when you no longer need it.  After the procedure, urine may flow better from your kidneys to your bladder. A ureteral stent may be left in place for several days or for as long as several months. Your doctor will take it out when you no longer need it.    Cystoscopy: What to Expect at Home  Your Recovery  A cystoscopy is a procedure that lets a doctor look inside of the bladder and the urethra. The urethra is the tube that carries urine from the bladder to outside the body. The doctor uses a thin, lighted tube called a cystoscope to look for kidney or bladder stones, tumors, bleeding, or infection. After the cystoscopy, your urethra may be sore at first, and it may burn when you urinate for the first few  following your procedure if you are currently using oral contraceptives as your primary form of birth control. In addition to this, we recommend continuing your oral contraceptive as prescribed, unless otherwise instructed by your physician, during this time.    Follow-up care is a key part of your treatment and safety. Be sure to make and go to all appointments, and call your doctor if you are having problems. It's also a good idea to know your test results and keep a list of the medicines you take.  When should you call for help?  Call 911 anytime you think you may need emergency care. For example, call if:  You passed out (lost consciousness).  You have severe trouble breathing.  You have sudden chest pain and shortness of breath, or you cough up blood.  You have severe belly pain.  Call your doctor now or seek immediate medical care if:  You are sick to your stomach or cannot keep fluids down.  Your urine is still red or you see blood clots after you have urinated several times.  You have trouble passing urine or stool, especially if you have pain or swelling in your lower belly.  You have signs of a blood clot, such as:  Pain in your calf, back of the knee, thigh, or groin.  Redness and swelling in your leg or groin.  You develop a fever or severe chills.  You have pain in your back just below your rib cage. This is called flank pain.  Watch closely for changes in your health, and be sure to contact your doctor if:  A burning feeling is normal for a day or two after the test, but call if it does not get better.  You have a frequent urge to urinate but can pass only small amounts of urine.   It is normal for the urine to have a pinkish color for a few days after the test, but call if it does not get better or if your urine is cloudy, smells bad, or has pus in it.    After general anesthesia or intravenous sedation, for 24 hours or while taking prescription Narcotics:  Limit your activities  A responsible adult

## 2024-12-17 NOTE — ANESTHESIA POSTPROCEDURE EVALUATION
Department of Anesthesiology  Postprocedure Note    Patient: Stormy Nash  MRN: [id number]  YOB: 1985  Date of evaluation: 12/17/2024    Procedure Summary       Date: 12/17/24 Room / Location: [facility] MAIN OR  / [facility] MAIN OR    Anesthesia Start: 1520 Anesthesia Stop: 1635    Procedure: CYSTOSCOPY LEFT URETEROSCOPY, LEFT STENT INSERTION, REMOVAL RIGHT UO STONE, LEFT RETROGRADE PYELOGRAM (Left: Ureter) Diagnosis:       Ureteral calculus, left      (Ureteral calculus, left [N20.1])    Providers: Tolu Doan MD Responsible Provider: Leo Perdomo MD    Anesthesia Type: General ASA Status: 1            Anesthesia Type: General    Kimberly Phase I: Kimberly Score: 5    Kimberly Phase II:      Anesthesia Post Evaluation    Patient location during evaluation: PACU  Patient participation: complete - patient participated  Level of consciousness: awake and alert  Airway patency: patent  Nausea & Vomiting: no nausea and no vomiting  Cardiovascular status: hemodynamically stable  Respiratory status: acceptable, nonlabored ventilation and spontaneous ventilation  Hydration status: euvolemic  Comments: /73   Pulse 83   Temp 97.5 °F (36.4 °C) (Temporal)   Resp 18   Ht 1.549 m (5' 1\")   Wt 72.6 kg (160 lb)   SpO2 100%   BMI 30.23 kg/m²     Multimodal analgesia pain management approach  Pain management: adequate and satisfactory to patient    No notable events documented.

## 2024-12-17 NOTE — PROGRESS NOTES
Upon arriving on the unit at 2108, pt was asked if she needed a  and she declined the . Messaged the night time provider to ask about something to help for painful urination. Waiting for a response. Provider ordered pyridium. Pt was medicated with 1 mg of dilaudid for pain this morning. Hourly rounds performed this shift. Bed lowered and locked. Call light within reach. All needs met at this time.

## 2024-12-17 NOTE — PERIOP NOTE
TRANSFER - OUT REPORT:    Verbal report given to CONNIE Hill on Stormy Nash  being transferred to [facility] for routine post-op       Report consisted of patient’s Situation, Background, Assessment and   Recommendations(SBAR).     Information from the following report(s) Nurse Handoff Report, Surgery Report, Intake/Output, MAR, Cardiac Rhythm SR, and Neuro Assessment was reviewed with the receiving nurse.    Lines:   Peripheral IV 12/16/24 Right Antecubital (Active)   Site Assessment Clean, dry & intact 12/17/24 1723   Line Status Flushed;Normal saline locked 12/17/24 1723   Line Care Connections checked and tightened 12/17/24 1723   Phlebitis Assessment No symptoms 12/17/24 1723   Infiltration Assessment 0 12/17/24 1723   Alcohol Cap Used No 12/17/24 0734   Dressing Status Clean, dry & intact 12/17/24 1723   Dressing Type Transparent 12/17/24 1723   Dressing Intervention New 12/16/24 1559        Opportunity for questions and clarification was provided.      Patient transported with:   O2 @ 2 liters  Tech    VTE prophylaxis orders have been written for Stormy Nash.    Patient and family given floor number and nurses name.  Family updated re: pt status after security code verified.

## 2024-12-17 NOTE — OP NOTE
[address]  [address]                            OPERATIVE REPORT      PATIENT NAME: GALLITO FISHER        : 1985  MED REC NO: [id number]                       ROOM: [facility] NO: [id number]                       ADMIT DATE: 2024  PROVIDER: Tolu Doan MD    DATE OF SERVICE:  2024    PREOPERATIVE DIAGNOSES:  Left proximal ureteral stone.    POSTOPERATIVE DIAGNOSES:  Left proximal ureteral stone and a right distal ureteral stone.    PROCEDURES PERFORMED:  Cystoscopy with removal of distal right ureteral stone, left retrograde pyelogram, attempted left ureteroscopy, left ureteral stent placement.    SURGEON:  Tolu Doan MD    ASSISTANT:  Angelique Oquendo SA    ANESTHESIA:  General with endotracheal tube.    ESTIMATED BLOOD LOSS:  Minimal.    SPECIMENS REMOVED:  Stone from right UO that was , sent for stone analysis    INTRAOPERATIVE FINDINGS:  The patient was taken to OR for the above procedure.  On CT scan, she appeared to have a stone within her bladder.  On initial cystoscopy, the stone was actually  at the right ureteral orifice.  I was able to remove it relatively atraumatically.  I then performed left retrograde pyelogram with findings below and attempted left ureteroscopy with stent placement.     COMPLICATIONS:  None.    IMPLANTS:  6-Yoruba by 24 cm ureteral stent without string on the left side.    INDICATIONS:  As below.    DESCRIPTION OF PROCEDURE:  After informed consent was obtained, the patient was taken to operating room 11 in [facility].  After induction of general anesthesia and placement of endotracheal tube, she was carefully positioned in low lithotomy position.  Her genitalia were prepped and draped in usual sterile fashion.  A time-out was performed.  She had previously been given Ancef as a prophylactic antibiotic.  I then carefully inserted a  stone protocol CT scan of the abdomen and pelvis.  If there is no evidence of residual stone on the left side, then I will plan to remove the stent in the office.  If there is, we will go back for a left CRULLS.        MD YOANDY FRANK/RUSSELL  D:  12/17/2024 16:36:55  T:  12/17/2024 17:04:13  JOB #:  [id number]

## 2024-12-17 NOTE — PROGRESS NOTES
TRANSFER - IN REPORT:    Verbal report received from Roseline ROSALES on Stormy Nash  being received from [place] ED for routine progression of patient care      Report consisted of patient's Situation, Background, Assessment and   Recommendations(SBAR).     Information from the following report(s) Nurse Handoff Report was reviewed with the receiving nurse.    Opportunity for questions and clarification was provided.      Assessment completed upon patient's arrival to unit and care assumed.

## 2024-12-17 NOTE — ED NOTES
TRANSFER - OUT REPORT:    Verbal report given to LORI ROSALES on Stormy Nash  being transferred to  620 for routine progression of patient care       Report consisted of patient's Situation, Background, Assessment and   Recommendations(SBAR).     Information from the following report(s) Nurse Handoff Report, ED Encounter Summary, ED SBAR, Adult Overview, MAR, Recent Results, and Event Log was reviewed with the receiving nurse.    Lehr Fall Assessment:    Presents to emergency department  because of falls (Syncope, seizure, or loss of consciousness): No  Age > 70: No  Altered Mental Status, Intoxication with alcohol or substance confusion (Disorientation, impaired judgment, poor safety awaremess, or inability to follow instructions): No  Impaired Mobility: Ambulates or transfers with assistive devices or assistance; Unable to ambulate or transer.: No  Nursing Judgement: No          Lines:   Peripheral IV 12/16/24 Right Antecubital (Active)   Site Assessment Clean, dry & intact 12/16/24 1559   Line Status Blood return noted;Brisk blood return 12/16/24 1559   Line Care Cap changed 12/16/24 1559   Phlebitis Assessment No symptoms 12/16/24 1559   Infiltration Assessment 0 12/16/24 1559   Alcohol Cap Used No 12/16/24 1559   Dressing Status New dressing applied;Clean, dry & intact 12/16/24 1559   Dressing Type Transparent 12/16/24 1559   Dressing Intervention New 12/16/24 1559        Opportunity for questions and clarification was provided.      Patient transported with:  Monitor POV

## 2024-12-17 NOTE — PROGRESS NOTES
TRANSFER - IN REPORT:    Verbal report received from CONNIE Ascencio  on Stormy Nash  being received from PACU for routine progression of patient care      Report consisted of patient's Situation, Background, Assessment and   Recommendations(SBAR).     Information from the following report(s) Surgery Report, Intake/Output, MAR, and Recent Results was reviewed with the receiving nurse.    Opportunity for questions and clarification was provided.      Assessment will be completed upon patient's arrival to unit and care will be assumed.

## 2024-12-17 NOTE — PROGRESS NOTES
Nutrition Assessment  Assessment Type: Initial  Reason for visit:  Best Practice Alert: Malnutrition Screening Tool   Malnutrition Screening Tool Score: 2    Nutrition Intervention:   Food and/or Nutrient Delivery:   Continue NPO.  Progression per provider post op.       Malnutrition Assessment:  Malnutrition Status: No malnutrition  Nutrition Focused Physical Exam: Unremarkable     Nutrition Assessment:  Food/Nutrition Related History:   Pt reports she has 2 jobs, her eating schedule is based around work.  She works at [employer] from 5a-12p, during this time she has coffee, breads.  At night she eats what her brother prepares - beans, rice, beef or Chipotle or Burger Valeriano if her bother is busy.  She denies any changes in oral intake until presenting to [place] ED and transferring to [facility] with MD instructing to come straight to ED and to not eat or drink.          Weight History:   +unsure of wt loss on MST.  FM office 2/20/2024 - 151#.  Pt states wt of 160#, consistent with bed scale.  This would represent wt gain.    Nutrition Background:       PMH remarkable for kidney stone.  Admitted with obstructed uretal stone.    OR planned this evening.    Nutrition Monitoring/Evaluation:  Pt is alert and oriented, declines .  She c/o headache from lack of coffee.  Inquires what time her surgery is scheduled.   She is currently NPO day 1, anticipate diet progression s/p OR.      Current Nutrition Therapies:  Diet NPO    Current Intake:   Average Meal Intake: NPO        Anthropometric Measures:  Height: 154.9 cm (5' 0.98\")  Current Body Wt: 72.6 kg (160 lb 0.9 oz) (12/16), Weight source: Stated  BMI: 30.3, Obese Class 1 (BMI 30.0-34.9)  Admission Body Weight: 72.6 kg (160 lb 0.9 oz) (12/17)  Ideal Body Weight (Kg) (Calculated): 48 kg (105 lbs), 152.4 %  BMI Category Obese Class 1 (BMI 30.0-34.9)  Comparative Standards:  Energy (kcal/day): 6185-2396 (20-25 kcal/kg) (Kcal/kg Weight used: 72.6 kg

## 2024-12-17 NOTE — ANESTHESIA PRE PROCEDURE
Department of Anesthesiology  Preprocedure Note       Name:  Stormy Nash   Age:  39 y.o.  :  1985                                          MRN:  [id number]         Date:  2024      Surgeon: Surgeon(s):  Tolu Doan MD    Procedure: Procedure(s):  CYSTOSCOPY LEFT URETEROSCOPY LASER LITHO STENT INSERTION POSSIBLE LITHOLAPAXY    Medications prior to admission:   Prior to Admission medications    Medication Sig Start Date End Date Taking? Authorizing Provider   vitamin B-12 (CYANOCOBALAMIN) 100 MCG tablet Take 0.5 tablets by mouth daily   Yes Kristen Moctezuma MD   Omega-3 1000 MG CAPS Take by mouth   Yes Kristen Moctezuma MD   medroxyPROGESTERone (PROVERA) 10 MG tablet 1 tablet 22  Yes Kristen Moctezuma MD   [employer] PAIN RELIEF 4 % CREA APLIQUE AL  ROLF AFECTADA JUANA VECES AL D A  Patient not taking: Reported on 2024   Kristen Moctezuma MD   cyanocobalamin 1000 MCG tablet Take 1 tablet by mouth daily    Automatic Reconciliation, Ar       Current medications:    Current Facility-Administered Medications   Medication Dose Route Frequency Provider Last Rate Last Admin    HYDROmorphone (DILAUDID) tablet 1 mg  1 mg Oral Q4H PRN Eugenio Luna, DO   1 mg at 24 0544    Or    HYDROmorphone (DILAUDID) tablet 2 mg  2 mg Oral Q4H PRN Jeremy Eugenio, DO        phenazopyridine (PYRIDIUM) tablet 95 mg  95 mg Oral TID PRN Katja Gonzalez MD   95 mg at 24 1413    sodium chloride flush 0.9 % injection 5-40 mL  5-40 mL IntraVENous 2 times per day Jeremy Eugenio, DO   10 mL at 24 2243    sodium chloride flush 0.9 % injection 5-40 mL  5-40 mL IntraVENous PRN Jeremy Eugenio, DO        0.9 % sodium chloride infusion   IntraVENous PRN Eugenio Luna, DO        potassium chloride (KLOR-CON M) extended release tablet 40 mEq  40 mEq Oral PRN Jeremy Eugenio, DO        Or    potassium bicarb-citric acid (EFFER-K) effervescent tablet 40 mEq  40 mEq Oral PRN Jeremy Eugenio, DO        Or

## 2024-12-17 NOTE — BRIEF OP NOTE
Brief Postoperative Note      Patient: Stormy Nash  YOB: 1985  MRN: [id number]    Date of Procedure: 2024    Pre-Op Diagnosis Codes:      * Ureteral calculus, left [N20.1]    Post-Op Diagnosis: Same and stone  at right UO       Procedure:  cystoscopy, removal of stone  at right UO; left retrograde pyelogram; attempted left urteroscopy; placement of left ureteral stent without string    Surgeon(s):  Tolu Doan MD    Assistant:  * No surgical staff found *    Anesthesia: General    Estimated Blood Loss (mL): Minimal    Complications: None    Specimens:   ID Type Source Tests Collected by Time Destination   1 : RIGHT URETHRAL ORIFICE STONE Stone (Calculus) Bladder STONE ANALYSIS Tolu Doan MD 2024 1558        Implants:  Implant Name Type Inv. Item Serial No.  Lot No. LRB No. Used Action   STENT URET 6FR L24CM HYDR+ GRAD CIRCUMFERENTIAL MRK LO PROF - [id number]  STENT URET 6FR L24CM HYDR+ GRAD CIRCUMFERENTIAL MRK LO PROF  [facility] UROLOGY-WD [id number] Left 1 Implanted         Drains:   Ureteral Drain/Stent 24 Left Ureter (Active)       Findings:  Infection Present At Time Of Surgery (PATOS) (choose all levels that have infection present):  No infection present  Other Findings: The 5 mm stone seen on CT scan that was thought to be in the bladder was actually  and somewhat impacted at the right UO.  I was able to grasp it with ureteroscopic stent graspers and carefully remove it from the UO.  There was minimal trauma to the UO therefore elected not to leave a stent.  I then performed a retrograde on the left side in the ureter appeared normal and there was no significant hydronephrosis.  I did not see a definitive stone.  I attempted left ureteroscopy but the UO and distal ureter were too narrow to safely pass the scope.  I therefore placed a stent and will allow the ureter to dilate.  I plan to see her back in the office

## 2024-12-17 NOTE — CARE COORDINATION
12/17/24 1239   Service Assessment   Patient Orientation Alert and Oriented   Cognition Alert   History Provided By Patient   Primary Caregiver Self   Support Systems Family Members;Children   Patient's Healthcare Decision Maker is: Legal Next of Kin   Prior Functional Level Independent in ADLs/IADLs   Current Functional Level Independent in ADLs/IADLs   Can patient return to prior living arrangement Yes   Ability to make needs known: Good   Family able to assist with home care needs: No   Would you like for me to discuss the discharge plan with any other family members/significant others, and if so, who? No   Financial Resources Other (Comment)  (commerical)   Community Resources None   Social/Functional History   Lives With Alone   Type of Home House   Condition of Participation: Discharge Planning   The Plan for Transition of Care is related to the following treatment goals: return home   The Patient and/or Patient Representative was provided with a Choice of Provider? Patient   The Patient and/Or Patient Representative agree with the Discharge Plan? Yes   Freedom of Choice list was provided with basic dialogue that supports the patient's individualized plan of care/goals, treatment preferences, and shares the quality data associated with the providers?  Yes     Assessment completed with patient at bedside. Patient lives alone. She is independent at baseline. No DME. She works and drives. Demographics and PCP confirmed. Discharge plan is to return home. No anticipated discharge needs.    Lyndsey WHITT, ACM  [place]

## 2024-12-17 NOTE — H&P
pelvis without intravenous or oral contrast.  Radiation dose reduction techniques were used for this study:  All CT scans performed at this facility use one or all of the following: Automated exposure control, adjustment of the mA and/or kVp according to patient's size, iterative reconstruction. COMPARISON: None FINDINGS: - KIDNEYS/URETERS: There is a 4 mm left nonobstructive mid renal calculus. There is a 2 mm left lower pole nonobstructive calculus. There is a 2 mm proximal left ureteric calculus with minimal left hydroureteronephrosis. There is a 2 mm nonobstructive right lower pole renal calculus. A 5 mm calculus is noted in the dependent portion of the bladder. - KIDNEY OR URETERAL STONE [place] UNIT: - BLADDER: Normal. - LUNG BASES: No infiltrates or masses. - LIVER: Normal in size and appearance.  - GALLBLADDER/BILE DUCTS: No gallstones or bile duct dilatation. - PANCREAS: Normal. - SPLEEN: Normal. - ADRENALS: Normal. - REPRODUCTIVE ORGANS: Low-attenuation changes right adnexa potentially a cyst versus follicles. - BOWEL: Normal caliber.  No inflammatory changes. - LYMPH NODES: No significant retroperitoneal, mesenteric, or pelvic adenopathy. - BONES: No fracture or significant bone lesion. - VASCULATURE: Normal - OTHER: No ascites. Potential small umbilical hernia. Foci of soft tissue edema in the anterior abdominal wall, nonspecific in nature.     1. There is a 2 mm left proximal ureteric calculus with minimal left-sided hydroureteronephrosis of the proximal ureter. Multiple nonobstructive bilateral renal calculi are also present. Furthermore however a 5 mm calculus is noted in the dependent portion of the bladder. Correlation recommended. Additional details are in the body report. Please see the entire body of report. ** If there are any questions about this report, I can be reached on Perfect[employer]** Electronically signed by Riley Newsome        Signed:  Eugenio Luna, DO    Part of this note may have  been written by using a voice dictation software.  The note has been proof read but may still contain some grammatical/other typographical errors.

## 2024-12-17 NOTE — PROGRESS NOTES
4 Eyes Skin Assessment     NAME:  Stormy Nash  YOB: 1985  MEDICAL RECORD NUMBER:  [id number]    The patient is being assessed for  Admission    I agree that at least one RN has performed a thorough Head to Toe Skin Assessment on the patient. ALL assessment sites listed below have been assessed.      Areas assessed by both nurses:    Head, Face, Ears, Shoulders, Back, Chest, Arms, Elbows, Hands, Sacrum. Buttock, Coccyx, Ischium, and Legs. Feet and Heels        Does the Patient have a Wound? No noted wound(s)       Ar Prevention initiated by RN: Yes  Wound Care Orders initiated by RN: No    Pressure Injury (Stage 3,4, Unstageable, DTI, NWPT, and Complex wounds) if present, place Wound referral order by RN under : No    New Ostomies, if present place, Ostomy referral order under : No     Nurse 1 eSignature: Electronically signed by CHRISTINA ROMAN RN on 12/16/24 at 9:43 PM EST    **SHARE this note so that the co-signing nurse can place an eSignature**    Nurse 2 eSignature: Electronically signed by ESTEFANI BLACK RN on 12/16/24 at 11:24 PM EST

## 2024-12-17 NOTE — PROGRESS NOTES
Hospitalist Progress Note   Admit Date:  2024  9:04 PM   Name:  Stormy Nash   Age:  39 y.o.  Sex:  female  :  1985   MRN:  [id number]   Room:  [facility]    Presenting/Chief Complaint: No chief complaint on file.     Reason(s) for Admission: Nephrolithiasis [N20.0]     HPI:   Stormy Nash is a 39 y.o. female with medical history of occasional who presented to [place] emergency department with complaints of dysuria and back pain.  Dysuria has been going on for approximately 2 weeks, has gotten worse over the preceding 48 hours.  She was seen by her primary care provider for workup of a possible kidney stone.  Her blood work at that time was essentially normal, he recommended that she come to the emergency department if symptoms do not get better.  They have not.     Vital signs were significant for hypertension on arrival otherwise grossly stable hypertension was 150/109.    Subjective & 24hr Events:   Patient seen on rounds this morning lying in bed.  Says she does not have any pain in her back, only pressure in her bladder.  No prior history of kidney stones.  Says she has some pressure with urination, but no fevers.    Assessment & Plan:     Nephrolithiasis  - As evidenced on CT abdomen/pelvis  - Urology consulted.  Planning for CLULLS later this afternoon  - On cefazolin for prophylactic antibiotics prior to the procedure.  UA not significant for infection, so can likely discharge patient not on antibiotics    GWEN  - As evidenced by 0.3 increasing creatinine over the last 24 hours.  In the setting of nephrolithiasis  - Urological procedure as above  - Encourage oral rehydration once able to take by mouth  - Repeat BMP in the morning      Anticipated Discharge Arrangements:   Home    PT/OT evals ordered?  Not ordered; patient not expected to need rehab  Diet:  Diet NPO  VTE prophylaxis: SCD's   Code status: Full Code      Non-peripheral Lines and Tubes (if present):       Monocytes Absolute 0.6 0.1 - 1.3 K/UL    Eosinophils Absolute 0.2 0.0 - 0.8 K/UL    Basophils Absolute 0.1 0.0 - 0.2 K/UL    Immature Granulocytes Absolute 0.0 0.0 - 0.5 K/UL   Comprehensive Metabolic Panel    Collection Time: 12/16/24  3:38 PM   Result Value Ref Range    Sodium 139 133 - 143 mmol/L    Potassium 4.3 3.5 - 5.1 mmol/L    Chloride 102 98 - 107 mmol/L    CO2 24 20 - 29 mmol/L    Anion Gap 13 7 - 16 mmol/L    Glucose 90 65 - 100 mg/dL    BUN 14 6 - 23 MG/DL    Creatinine 0.74 (L) 0.80 - 1.30 MG/DL    Est, Glom Filt Rate >90 >60 ml/min/1.73m2    Calcium 10.1 8.8 - 10.2 MG/DL    Total Bilirubin 0.3 0.0 - 1.2 MG/DL    ALT 10 (L) 12 - 65 U/L    AST 18 15 - 37 U/L    Alk Phosphatase 93 35 - 104 U/L    Total Protein 8.4 (H) 6.3 - 8.2 g/dL    Albumin 4.8 3.5 - 5.0 g/dL    Globulin 3.6 (H) 2.3 - 3.5 g/dL    Albumin/Globulin Ratio 1.3 1.0 - 1.9     Lipase    Collection Time: 12/16/24  3:38 PM   Result Value Ref Range    Lipase 30 13 - 60 U/L   Urinalysis    Collection Time: 12/16/24  3:38 PM   Result Value Ref Range    Color, UA YELLOW      Appearance CLEAR      Specific Gravity, UA >=1.030 1.001 - 1.023    pH, Urine 5.5 5.0 - 9.0      Protein, UA Negative NEG mg/dL    Glucose, Ur Negative NEG mg/dL    Ketones, Urine Negative NEG mg/dL    Bilirubin, Urine Negative NEG      Blood, Urine MODERATE (A) NEG      Urobilinogen, Urine 0.2 0.2 - 1.0 EU/dL    Nitrite, Urine Negative NEG      Leukocyte Esterase, Urine Negative NEG     Urine Preg (Lab)    Collection Time: 12/16/24  3:38 PM   Result Value Ref Range    Pregnancy, Urine Negative     Urinalysis, Micro    Collection Time: 12/16/24  3:38 PM   Result Value Ref Range    WBC, UA 0-3 0 /hpf    RBC, UA 5-10 0 /hpf    Epithelial Cells, UA 0-3 0 /hpf    BACTERIA, URINE 0 0 /hpf    Mucus, UA 0 0 /lpf    Other observations RESULTS VERIFIED MANUALLY     Wet prep, genital    Collection Time: 12/16/24  3:54 PM    Specimen: Vaginal   Result Value Ref Range    Special Requests

## 2024-12-18 DIAGNOSIS — N20.1 CALCULUS OF PROXIMAL LEFT URETER: Primary | ICD-10-CM

## 2024-12-18 PROBLEM — N13.30 HYDROURETERONEPHROSIS: Status: ACTIVE | Noted: 2024-12-18

## 2024-12-18 PROBLEM — N13.2 URETERAL STONE WITH HYDRONEPHROSIS: Status: ACTIVE | Noted: 2024-12-18

## 2024-12-18 PROBLEM — N13.2 URETERAL STONE WITH HYDRONEPHROSIS: Status: ACTIVE | Noted: 2024-12-16

## 2024-12-18 LAB
ANION GAP SERPL CALC-SCNC: 14 MMOL/L (ref 7–16)
BASOPHILS # BLD: 0 K/UL (ref 0–0.2)
BASOPHILS NFR BLD: 0 % (ref 0–2)
BUN SERPL-MCNC: 16 MG/DL (ref 6–23)
CALCIUM SERPL-MCNC: 9.3 MG/DL (ref 8.8–10.2)
CHLORIDE SERPL-SCNC: 102 MMOL/L (ref 98–107)
CO2 SERPL-SCNC: 20 MMOL/L (ref 20–29)
CREAT SERPL-MCNC: 0.86 MG/DL (ref 0.6–1.1)
DIFFERENTIAL METHOD BLD: ABNORMAL
EOSINOPHIL # BLD: 0 K/UL (ref 0–0.8)
EOSINOPHIL NFR BLD: 0 % (ref 0.5–7.8)
ERYTHROCYTE [DISTWIDTH] IN BLOOD BY AUTOMATED COUNT: 12.5 % (ref 11.9–14.6)
GLUCOSE SERPL-MCNC: 145 MG/DL (ref 70–99)
HCT VFR BLD AUTO: 40.8 % (ref 35.8–46.3)
HGB BLD-MCNC: 13.8 G/DL (ref 11.7–15.4)
IMM GRANULOCYTES # BLD AUTO: 0.1 K/UL (ref 0–0.5)
IMM GRANULOCYTES NFR BLD AUTO: 0 % (ref 0–5)
LYMPHOCYTES # BLD: 0.9 K/UL (ref 0.5–4.6)
LYMPHOCYTES NFR BLD: 6 % (ref 13–44)
MCH RBC QN AUTO: 29.2 PG (ref 26.1–32.9)
MCHC RBC AUTO-ENTMCNC: 33.8 G/DL (ref 31.4–35)
MCV RBC AUTO: 86.4 FL (ref 82–102)
MONOCYTES # BLD: 0.5 K/UL (ref 0.1–1.3)
MONOCYTES NFR BLD: 3 % (ref 4–12)
NEUTS SEG # BLD: 14.5 K/UL (ref 1.7–8.2)
NEUTS SEG NFR BLD: 91 % (ref 43–78)
NRBC # BLD: 0 K/UL (ref 0–0.2)
PLATELET # BLD AUTO: 390 K/UL (ref 150–450)
PMV BLD AUTO: 9.1 FL (ref 9.4–12.3)
POTASSIUM SERPL-SCNC: 4.8 MMOL/L (ref 3.5–5.1)
RBC # BLD AUTO: 4.72 M/UL (ref 4.05–5.2)
SODIUM SERPL-SCNC: 136 MMOL/L (ref 136–145)
WBC # BLD AUTO: 15.9 K/UL (ref 4.3–11.1)

## 2024-12-18 PROCEDURE — 2500000003 HC RX 250 WO HCPCS: Performed by: UROLOGY

## 2024-12-18 PROCEDURE — 6360000002 HC RX W HCPCS: Performed by: UROLOGY

## 2024-12-18 PROCEDURE — 6360000002 HC RX W HCPCS: Performed by: PHYSICIAN ASSISTANT

## 2024-12-18 PROCEDURE — 85025 COMPLETE CBC W/AUTO DIFF WBC: CPT

## 2024-12-18 PROCEDURE — 6370000000 HC RX 637 (ALT 250 FOR IP): Performed by: UROLOGY

## 2024-12-18 PROCEDURE — 80048 BASIC METABOLIC PNL TOTAL CA: CPT

## 2024-12-18 PROCEDURE — 1100000000 HC RM PRIVATE

## 2024-12-18 PROCEDURE — 6370000000 HC RX 637 (ALT 250 FOR IP): Performed by: NURSE PRACTITIONER

## 2024-12-18 PROCEDURE — 6360000002 HC RX W HCPCS: Performed by: FAMILY MEDICINE

## 2024-12-18 PROCEDURE — 51798 US URINE CAPACITY MEASURE: CPT

## 2024-12-18 PROCEDURE — 99231 SBSQ HOSP IP/OBS SF/LOW 25: CPT | Performed by: PHYSICIAN ASSISTANT

## 2024-12-18 PROCEDURE — 36415 COLL VENOUS BLD VENIPUNCTURE: CPT

## 2024-12-18 RX ORDER — KETOROLAC TROMETHAMINE 15 MG/ML
15 INJECTION, SOLUTION INTRAMUSCULAR; INTRAVENOUS EVERY 6 HOURS
Status: DISCONTINUED | OUTPATIENT
Start: 2024-12-18 | End: 2024-12-19 | Stop reason: HOSPADM

## 2024-12-18 RX ORDER — CEPHALEXIN 500 MG/1
500 CAPSULE ORAL 3 TIMES DAILY
Qty: 15 CAPSULE | Refills: 0 | Status: CANCELLED | OUTPATIENT
Start: 2024-12-18 | End: 2024-12-23

## 2024-12-18 RX ORDER — KETOROLAC TROMETHAMINE 15 MG/ML
15 INJECTION, SOLUTION INTRAMUSCULAR; INTRAVENOUS EVERY 6 HOURS PRN
Status: DISCONTINUED | OUTPATIENT
Start: 2024-12-18 | End: 2024-12-18

## 2024-12-18 RX ORDER — PHENAZOPYRIDINE HYDROCHLORIDE 95 MG/1
200 TABLET ORAL 3 TIMES DAILY PRN
Status: DISCONTINUED | OUTPATIENT
Start: 2024-12-18 | End: 2024-12-19 | Stop reason: HOSPADM

## 2024-12-18 RX ORDER — DIPHENHYDRAMINE HCL 25 MG
25 CAPSULE ORAL EVERY 6 HOURS PRN
Status: DISCONTINUED | OUTPATIENT
Start: 2024-12-18 | End: 2024-12-19 | Stop reason: HOSPADM

## 2024-12-18 RX ADMIN — SODIUM CHLORIDE, PRESERVATIVE FREE 10 ML: 5 INJECTION INTRAVENOUS at 10:10

## 2024-12-18 RX ADMIN — WATER 1000 MG: 1 INJECTION INTRAMUSCULAR; INTRAVENOUS; SUBCUTANEOUS at 23:08

## 2024-12-18 RX ADMIN — SODIUM CHLORIDE, PRESERVATIVE FREE 10 ML: 5 INJECTION INTRAVENOUS at 20:35

## 2024-12-18 RX ADMIN — HYOSCYAMINE SULFATE 0.12 MG: 0.12 TABLET ORAL; SUBLINGUAL at 04:37

## 2024-12-18 RX ADMIN — PHENAZOPYRIDINE HYDROCHLORIDE 95 MG: 95 TABLET ORAL at 02:21

## 2024-12-18 RX ADMIN — KETOROLAC TROMETHAMINE 15 MG: 15 INJECTION, SOLUTION INTRAMUSCULAR; INTRAVENOUS at 12:40

## 2024-12-18 RX ADMIN — HYDROMORPHONE HYDROCHLORIDE 2 MG: 2 TABLET ORAL at 02:24

## 2024-12-18 RX ADMIN — ONDANSETRON 4 MG: 2 INJECTION INTRAMUSCULAR; INTRAVENOUS at 03:58

## 2024-12-18 RX ADMIN — WATER 1000 MG: 1 INJECTION INTRAMUSCULAR; INTRAVENOUS; SUBCUTANEOUS at 16:25

## 2024-12-18 RX ADMIN — HYOSCYAMINE SULFATE 0.12 MG: 0.12 TABLET ORAL; SUBLINGUAL at 12:40

## 2024-12-18 RX ADMIN — WATER 1000 MG: 1 INJECTION INTRAMUSCULAR; INTRAVENOUS; SUBCUTANEOUS at 05:32

## 2024-12-18 RX ADMIN — KETOROLAC TROMETHAMINE 15 MG: 15 INJECTION, SOLUTION INTRAMUSCULAR; INTRAVENOUS at 23:08

## 2024-12-18 RX ADMIN — HYDROMORPHONE HYDROCHLORIDE 2 MG: 2 TABLET ORAL at 07:47

## 2024-12-18 RX ADMIN — HYDROMORPHONE HYDROCHLORIDE 2 MG: 2 TABLET ORAL at 20:33

## 2024-12-18 RX ADMIN — KETOROLAC TROMETHAMINE 15 MG: 15 INJECTION, SOLUTION INTRAMUSCULAR; INTRAVENOUS at 17:51

## 2024-12-18 ASSESSMENT — PAIN DESCRIPTION - DESCRIPTORS
DESCRIPTORS: ACHING;BURNING
DESCRIPTORS: SORE;ACHING
DESCRIPTORS: STABBING;SHOOTING;ACHING

## 2024-12-18 ASSESSMENT — PAIN SCALES - GENERAL
PAINLEVEL_OUTOF10: 5
PAINLEVEL_OUTOF10: 2
PAINLEVEL_OUTOF10: 9
PAINLEVEL_OUTOF10: 10
PAINLEVEL_OUTOF10: 5
PAINLEVEL_OUTOF10: 3
PAINLEVEL_OUTOF10: 10

## 2024-12-18 ASSESSMENT — PAIN DESCRIPTION - LOCATION
LOCATION: ABDOMEN;BACK;PELVIS
LOCATION: FLANK
LOCATION: FLANK
LOCATION: OTHER (COMMENT);FLANK
LOCATION: ABDOMEN

## 2024-12-18 ASSESSMENT — PAIN DESCRIPTION - ONSET: ONSET: ON-GOING

## 2024-12-18 ASSESSMENT — PAIN - FUNCTIONAL ASSESSMENT
PAIN_FUNCTIONAL_ASSESSMENT: ACTIVITIES ARE NOT PREVENTED
PAIN_FUNCTIONAL_ASSESSMENT: PREVENTS OR INTERFERES SOME ACTIVE ACTIVITIES AND ADLS
PAIN_FUNCTIONAL_ASSESSMENT: ACTIVITIES ARE NOT PREVENTED
PAIN_FUNCTIONAL_ASSESSMENT: PREVENTS OR INTERFERES SOME ACTIVE ACTIVITIES AND ADLS
PAIN_FUNCTIONAL_ASSESSMENT: ACTIVITIES ARE NOT PREVENTED

## 2024-12-18 ASSESSMENT — PAIN DESCRIPTION - ORIENTATION
ORIENTATION: LEFT;ANTERIOR;POSTERIOR
ORIENTATION: MID;LEFT;RIGHT
ORIENTATION: LEFT;POSTERIOR
ORIENTATION: LEFT;ANTERIOR;POSTERIOR
ORIENTATION: LEFT

## 2024-12-18 ASSESSMENT — PAIN DESCRIPTION - PAIN TYPE: TYPE: ACUTE PAIN;SURGICAL PAIN

## 2024-12-18 ASSESSMENT — PAIN DESCRIPTION - FREQUENCY: FREQUENCY: INTERMITTENT

## 2024-12-18 NOTE — PROGRESS NOTES
During language assessment, patient verbalized her ability to speak some English but prefers using interpreters for communication. language services resources available were offered as needed.      Renu LOPES  Senior /Cultural Navigator   Language Services Department  [phone] (phone)

## 2024-12-18 NOTE — PROGRESS NOTES
has made initial visit and completed spiritual assessment.  CH reviewed pt's chart. Pt shared about his concerns and pain. Pt requested prayer. CH offered words of encouragement and prayed for the pt. Ch provided spiritual care and emotional support through pastoral presence, non-anxious presence, active listening, and prayer. CH is available as needed.

## 2024-12-18 NOTE — CARE COORDINATION
Chart reviewed and patient discussed in IDT rounds this AM. Patient lives alone and is independent at baseline. Discharge plan is to return home.     Lyndsey WHITT, ACM  [facility]

## 2024-12-18 NOTE — PROGRESS NOTES
Admit Date: 12/16/2024    Subjective:     Patient reports suprapubic pain, flank pain when voiding.    Objective:     Patient Vitals for the past 8 hrs:   BP Temp Temp src Pulse Resp SpO2   12/18/24 0817 -- -- -- -- 20 --   12/18/24 0747 -- -- -- -- 22 --   12/18/24 0713 116/66 98.2 °F (36.8 °C) Oral 68 20 100 %   12/18/24 0417 120/82 97.5 °F (36.4 °C) Oral 76 18 98 %     12/18 0701 - 12/18 1900  In: 10 [I.V.:10]  Out: -   12/16 1901 - 12/18 0700  In: 340 [P.O.:250; I.V.:90]  Out: 625 [Urine:625]    Physical Exam:  GENERAL ASSESSMENT: alert, oriented to person, place and time, no acute distress and no anxiety, depression or agitation  Chest: Easy work of breathing  CVS exam: RRR  ABDOMEN: not done  Neurological exam reveals alert, oriented, normal speech, no focal findings or movement disorder noted.  FEMALE GENITOURINARY EXAM: not done  MALE GENITAL EXAM: not done        Data Review   Recent Results (from the past 24 hour(s))   Basic Metabolic Panel w/ Reflex to MG    Collection Time: 12/18/24  4:17 AM   Result Value Ref Range    Sodium 136 136 - 145 mmol/L    Potassium 4.8 3.5 - 5.1 mmol/L    Chloride 102 98 - 107 mmol/L    CO2 20 20 - 29 mmol/L    Anion Gap 14 7 - 16 mmol/L    Glucose 145 (H) 70 - 99 mg/dL    BUN 16 6 - 23 MG/DL    Creatinine 0.86 0.60 - 1.10 MG/DL    Est, Glom Filt Rate 88 >60 ml/min/1.73m2    Calcium 9.3 8.8 - 10.2 MG/DL   CBC with Auto Differential    Collection Time: 12/18/24  4:17 AM   Result Value Ref Range    WBC 15.9 (H) 4.3 - 11.1 K/uL    RBC 4.72 4.05 - 5.2 M/uL    Hemoglobin 13.8 11.7 - 15.4 g/dL    Hematocrit 40.8 35.8 - 46.3 %    MCV 86.4 82 - 102 FL    MCH 29.2 26.1 - 32.9 PG    MCHC 33.8 31.4 - 35.0 g/dL    RDW 12.5 11.9 - 14.6 %    Platelets 390 150 - 450 K/uL    MPV 9.1 (L) 9.4 - 12.3 FL    nRBC 0.00 0.0 - 0.2 K/uL    Differential Type AUTOMATED      Neutrophils % 91 (H) 43 - 78 %    Lymphocytes % 6 (L) 13 - 44 %    Monocytes % 3 (L) 4.0 - 12.0 %    Eosinophils % 0 (L) 0.5 - 7.8

## 2024-12-18 NOTE — PROGRESS NOTES
VSS. Little intake.  Unusual amount of pain in left/right flank and yells out when voiding.  Urine is orange, hazy with aprox 500 ml voided.  Medicated with po dilaudid and pyridium with no improvement.  Bladder scanned x 2; WNL.  Perfect serve to urology and levsin ordered and given with little improvement.  Call light in reach, bed alarm on.

## 2024-12-18 NOTE — PROGRESS NOTES
Hospitalist Progress Note   Admit Date:  2024  9:04 PM   Name:  Stormy Nash   Age:  39 y.o.  Sex:  female  :  1985   MRN:  [id number]   Room:  [facility]/    Presenting/Chief Complaint: No chief complaint on file.     Reason(s) for Admission: Nephrolithiasis [N20.0]     Hospital Day #2      Hospital Course:   Stormy Nash is a 39 y.o. female with medical history of occasional who presented to [place] emergency department with complaints of dysuria and back pain. Dysuria has been going on for approximately 2 weeks, has gotten worse over the preceding 48 hours. She was seen by her primary care provider for workup of a possible kidney stone. Her blood work at that time was essentially normal, he recommended that she come to the emergency department if symptoms did not get better, which they did not.    Subjective & 24hr Events:   Patient reclining in bed endorsing uncontrolled mid and lower abdominal pain, worse during voiding episodes, and radiating to the left flank.  She is tolerating p.o.    Assessment & Plan:     Right ureteral stone/left ureteral stone with hydroureteronephrosis  Status-post cystoscopy with removal of right ureteral stone and placement of left ureteral stent ().  The patient is to follow-up with Dr. Doan following her return to this area after a trip out of town for the holidays.  Pain control remains a significant barrier to progression of care, she required 7 doses of Dilaudid over the last 24 hours.  Add Toradol every 6 hours.    Acute kidney injury  Resolving.      Anticipated Discharge Arrangements:   Home    PT/OT evals ordered?  Not ordered; patient not expected to need rehab  Diet:  ADULT DIET; Regular  VTE prophylaxis: SCD's   Code status: Full Code      Non-peripheral Lines and Tubes (if present):      External Urinary Catheter (Active)        Telemetry (if present):  Cardiac/Telemetry Monitor On: No        Hospital Problems:  Principal  Range    Sodium 139 133 - 143 mmol/L    Potassium 4.3 3.5 - 5.1 mmol/L    Chloride 102 98 - 107 mmol/L    CO2 24 20 - 29 mmol/L    Anion Gap 13 7 - 16 mmol/L    Glucose 90 65 - 100 mg/dL    BUN 14 6 - 23 MG/DL    Creatinine 0.74 (L) 0.80 - 1.30 MG/DL    Est, Glom Filt Rate >90 >60 ml/min/1.73m2    Calcium 10.1 8.8 - 10.2 MG/DL    Total Bilirubin 0.3 0.0 - 1.2 MG/DL    ALT 10 (L) 12 - 65 U/L    AST 18 15 - 37 U/L    Alk Phosphatase 93 35 - 104 U/L    Total Protein 8.4 (H) 6.3 - 8.2 g/dL    Albumin 4.8 3.5 - 5.0 g/dL    Globulin 3.6 (H) 2.3 - 3.5 g/dL    Albumin/Globulin Ratio 1.3 1.0 - 1.9     Lipase    Collection Time: 12/16/24  3:38 PM   Result Value Ref Range    Lipase 30 13 - 60 U/L   Urinalysis    Collection Time: 12/16/24  3:38 PM   Result Value Ref Range    Color, UA YELLOW      Appearance CLEAR      Specific Gravity, UA >=1.030 1.001 - 1.023    pH, Urine 5.5 5.0 - 9.0      Protein, UA Negative NEG mg/dL    Glucose, Ur Negative NEG mg/dL    Ketones, Urine Negative NEG mg/dL    Bilirubin, Urine Negative NEG      Blood, Urine MODERATE (A) NEG      Urobilinogen, Urine 0.2 0.2 - 1.0 EU/dL    Nitrite, Urine Negative NEG      Leukocyte Esterase, Urine Negative NEG     Urine Preg (Lab)    Collection Time: 12/16/24  3:38 PM   Result Value Ref Range    Pregnancy, Urine Negative     Urinalysis, Micro    Collection Time: 12/16/24  3:38 PM   Result Value Ref Range    WBC, UA 0-3 0 /hpf    RBC, UA 5-10 0 /hpf    Epithelial Cells, UA 0-3 0 /hpf    BACTERIA, URINE 0 0 /hpf    Mucus, UA 0 0 /lpf    Other observations RESULTS VERIFIED MANUALLY     Wet prep, genital    Collection Time: 12/16/24  3:54 PM    Specimen: Vaginal   Result Value Ref Range    Special Requests NO SPECIAL REQUESTS      Wet Prep NO WBC'S SEEN      Wet Prep NO YEAST SEEN      Wet Prep NO TRICHOMONAS SEEN      Wet Prep CLUE CELLS ABSENT     Basic Metabolic Panel w/ Reflex to MG    Collection Time: 12/17/24  4:27 AM   Result Value Ref Range    Sodium 141 136

## 2024-12-18 NOTE — PLAN OF CARE
Problem: Discharge Planning  Goal: Discharge to home or other facility with appropriate resources  Outcome: Progressing     Problem: Pain  Goal: Verbalizes/displays adequate comfort level or baseline comfort level  Outcome: Progressing     Problem: Genitourinary - Adult  Goal: Absence of urinary retention  Outcome: Progressing

## 2024-12-19 VITALS
HEART RATE: 66 BPM | SYSTOLIC BLOOD PRESSURE: 114 MMHG | HEIGHT: 61 IN | RESPIRATION RATE: 12 BRPM | WEIGHT: 160 LBS | OXYGEN SATURATION: 97 % | TEMPERATURE: 97.9 F | BODY MASS INDEX: 30.21 KG/M2 | DIASTOLIC BLOOD PRESSURE: 87 MMHG

## 2024-12-19 LAB
ANION GAP SERPL CALC-SCNC: 10 MMOL/L (ref 7–16)
BASOPHILS # BLD: 0 K/UL (ref 0–0.2)
BASOPHILS NFR BLD: 0 % (ref 0–2)
BUN SERPL-MCNC: 22 MG/DL (ref 6–23)
CALCIUM SERPL-MCNC: 8.7 MG/DL (ref 8.8–10.2)
CHLORIDE SERPL-SCNC: 107 MMOL/L (ref 98–107)
CO2 SERPL-SCNC: 22 MMOL/L (ref 20–29)
CREAT SERPL-MCNC: 0.87 MG/DL (ref 0.6–1.1)
DIFFERENTIAL METHOD BLD: ABNORMAL
EOSINOPHIL # BLD: 0 K/UL (ref 0–0.8)
EOSINOPHIL NFR BLD: 0 % (ref 0.5–7.8)
ERYTHROCYTE [DISTWIDTH] IN BLOOD BY AUTOMATED COUNT: 13 % (ref 11.9–14.6)
GLUCOSE SERPL-MCNC: 109 MG/DL (ref 70–99)
HCT VFR BLD AUTO: 39.4 % (ref 35.8–46.3)
HGB BLD-MCNC: 13.1 G/DL (ref 11.7–15.4)
IMM GRANULOCYTES # BLD AUTO: 0.1 K/UL (ref 0–0.5)
IMM GRANULOCYTES NFR BLD AUTO: 1 % (ref 0–5)
LYMPHOCYTES # BLD: 2.9 K/UL (ref 0.5–4.6)
LYMPHOCYTES NFR BLD: 27 % (ref 13–44)
MCH RBC QN AUTO: 29.4 PG (ref 26.1–32.9)
MCHC RBC AUTO-ENTMCNC: 33.2 G/DL (ref 31.4–35)
MCV RBC AUTO: 88.3 FL (ref 82–102)
MONOCYTES # BLD: 0.6 K/UL (ref 0.1–1.3)
MONOCYTES NFR BLD: 6 % (ref 4–12)
NEUTS SEG # BLD: 7.2 K/UL (ref 1.7–8.2)
NEUTS SEG NFR BLD: 66 % (ref 43–78)
NRBC # BLD: 0 K/UL (ref 0–0.2)
PLATELET # BLD AUTO: 355 K/UL (ref 150–450)
PMV BLD AUTO: 8.8 FL (ref 9.4–12.3)
POTASSIUM SERPL-SCNC: 4.3 MMOL/L (ref 3.5–5.1)
RBC # BLD AUTO: 4.46 M/UL (ref 4.05–5.2)
SODIUM SERPL-SCNC: 139 MMOL/L (ref 136–145)
WBC # BLD AUTO: 10.9 K/UL (ref 4.3–11.1)

## 2024-12-19 PROCEDURE — 85025 COMPLETE CBC W/AUTO DIFF WBC: CPT

## 2024-12-19 PROCEDURE — 80048 BASIC METABOLIC PNL TOTAL CA: CPT

## 2024-12-19 PROCEDURE — 99231 SBSQ HOSP IP/OBS SF/LOW 25: CPT | Performed by: PHYSICIAN ASSISTANT

## 2024-12-19 PROCEDURE — 6360000002 HC RX W HCPCS: Performed by: UROLOGY

## 2024-12-19 PROCEDURE — 2500000003 HC RX 250 WO HCPCS: Performed by: UROLOGY

## 2024-12-19 PROCEDURE — 36415 COLL VENOUS BLD VENIPUNCTURE: CPT

## 2024-12-19 PROCEDURE — 6360000002 HC RX W HCPCS: Performed by: FAMILY MEDICINE

## 2024-12-19 RX ORDER — CEPHALEXIN 500 MG/1
500 CAPSULE ORAL 3 TIMES DAILY
Qty: 15 CAPSULE | Refills: 0 | Status: SHIPPED | OUTPATIENT
Start: 2024-12-19 | End: 2024-12-24

## 2024-12-19 RX ORDER — HYDROCODONE BITARTRATE AND ACETAMINOPHEN 5; 325 MG/1; MG/1
1 TABLET ORAL EVERY 4 HOURS PRN
Qty: 6 TABLET | Refills: 0 | Status: SHIPPED | OUTPATIENT
Start: 2024-12-19 | End: 2024-12-22

## 2024-12-19 RX ORDER — HYDROCODONE BITARTRATE AND ACETAMINOPHEN 7.5; 325 MG/1; MG/1
1 TABLET ORAL EVERY 6 HOURS PRN
Qty: 12 TABLET | Refills: 0 | Status: CANCELLED | OUTPATIENT
Start: 2024-12-19 | End: 2024-12-22

## 2024-12-19 RX ADMIN — WATER 1000 MG: 1 INJECTION INTRAMUSCULAR; INTRAVENOUS; SUBCUTANEOUS at 05:57

## 2024-12-19 RX ADMIN — KETOROLAC TROMETHAMINE 15 MG: 15 INJECTION, SOLUTION INTRAMUSCULAR; INTRAVENOUS at 05:57

## 2024-12-19 RX ADMIN — SODIUM CHLORIDE, PRESERVATIVE FREE 10 ML: 5 INJECTION INTRAVENOUS at 09:39

## 2024-12-19 ASSESSMENT — PAIN SCALES - GENERAL: PAINLEVEL_OUTOF10: 0

## 2024-12-19 NOTE — DISCHARGE SUMMARY
Hospitalist Discharge Summary   Admit Date:  2024  9:04 PM   DC Note date: 2024  Name:  Stormy Nash   Age:  39 y.o.  Sex:  female  :  1985   MRN:  [id number]   Room:  [facility]  PCP:  Darío Muhammad DO    Presenting Complaint: No chief complaint on file.     Initial Admission Diagnosis: Nephrolithiasis [N20.0]     Problem List for this Hospitalization (present on admission):    Principal Problem:    Ureteral stone with hydronephrosis, left  Active Problems:    Nephrolithiasis    GWEN (acute kidney injury) ([facility])    Right ureteral stone  Resolved Problems:    * No resolved hospital problems. *      Hospital Course:  Stormy Nash is a 39 y.o. female with medical history of occasional who presented to [place] emergency department with complaints of dysuria and back pain. Dysuria has been going on for approximately 2 weeks, has gotten worse over the preceding 48 hours. She was seen by her primary care provider for workup of a possible kidney stone. Her blood work at that time was essentially normal, he recommended that she come to the emergency department if symptoms did not get better, which they did not.     -------------------------------------------------------------------------------------------------------------------------------  Right ureteral stone/left ureteral stone with hydroureteronephrosis  Status-post cystoscopy with removal of right ureteral stone and placement of left ureteral stent ().  The patient is to follow-up with Dr. Doan following her return to this area after a trip out of town for the holidays.  Pain control much improved over the preceding 24 hours than it was prior.  She is anxious to discharge home and is stable to do so.  Prophylactic Keflex was prescribed given the recent  interventions.     Acute kidney injury  Resolved.      Disposition: Home  Diet: ADULT DIET; Regular  Code Status: Full Code    Follow Ups:  Follow-up Information   08:21 AM    CHOLHDLRATIO 4.4 02/16/2024 08:21 AM    TRIG 83 02/16/2024 08:21 AM      Thyroid  No results found for: \"TSHELE\", \"[id number]\"     Most Recent UA Lab Results   Component Value Date/Time    COLORU YELLOW 12/16/2024 03:38 PM    APPEARANCE CLEAR 12/16/2024 03:38 PM    PROTEINU Negative 12/16/2024 03:38 PM    GLUCOSEU Negative 12/16/2024 03:38 PM    GLUCOSEU Negative 01/31/2023 09:20 AM    KETUA Negative 12/16/2024 03:38 PM    BILIRUBINUR Negative 12/16/2024 03:38 PM    BLOODU MODERATE 12/16/2024 03:38 PM    UROBILINOGEN 0.2 12/16/2024 03:38 PM    NITRU Negative 12/16/2024 03:38 PM    LEUKOCYTESUR Negative 12/16/2024 03:38 PM    WBCUA 0-3 12/16/2024 03:38 PM    RBCUA 5-10 12/16/2024 03:38 PM    BACTERIA 0 12/16/2024 03:38 PM    LABCAST 0-2 01/31/2023 09:20 AM    MUCUS 0 12/16/2024 03:38 PM        Microbiology:  Results       Procedure Component Value Units Date/Time    Wet prep, genital [[id number]] Collected: 12/16/24 1554    Order Status: Completed Specimen: Vaginal Updated: 12/16/24 1613     Special Requests NO SPECIAL REQUESTS        Wet Prep NO WBC'S SEEN        Wet Prep NO YEAST SEEN        Wet Prep NO TRICHOMONAS SEEN        Wet Prep CLUE CELLS ABSENT       C.trachomatis N.gonorrhoeae DNA [[id number]] Collected: 12/16/24 1554    Order Status: Canceled Specimen: Vaginal             All Labs from Last 24 Hrs:  Recent Results (from the past 24 hour(s))   Basic Metabolic Panel w/ Reflex to MG    Collection Time: 12/19/24  5:31 AM   Result Value Ref Range    Sodium 139 136 - 145 mmol/L    Potassium 4.3 3.5 - 5.1 mmol/L    Chloride 107 98 - 107 mmol/L    CO2 22 20 - 29 mmol/L    Anion Gap 10 7 - 16 mmol/L    Glucose 109 (H) 70 - 99 mg/dL    BUN 22 6 - 23 MG/DL    Creatinine 0.87 0.60 - 1.10 MG/DL    Est, Glom Filt Rate 88 >60 ml/min/1.73m2    Calcium 8.7 (L) 8.8 - 10.2 MG/DL   CBC with Auto Differential    Collection Time: 12/19/24  5:31 AM   Result Value Ref Range    WBC 10.9 4.3 - 11.1 K/uL    RBC 4.46 4.05  - 5.2 M/uL    Hemoglobin 13.1 11.7 - 15.4 g/dL    Hematocrit 39.4 35.8 - 46.3 %    MCV 88.3 82 - 102 FL    MCH 29.4 26.1 - 32.9 PG    MCHC 33.2 31.4 - 35.0 g/dL    RDW 13.0 11.9 - 14.6 %    Platelets 355 150 - 450 K/uL    MPV 8.8 (L) 9.4 - 12.3 FL    nRBC 0.00 0.0 - 0.2 K/uL    Differential Type AUTOMATED      Neutrophils % 66 43 - 78 %    Lymphocytes % 27 13 - 44 %    Monocytes % 6 4.0 - 12.0 %    Eosinophils % 0 (L) 0.5 - 7.8 %    Basophils % 0 0.0 - 2.0 %    Immature Granulocytes % 1 0.0 - 5.0 %    Neutrophils Absolute 7.2 1.7 - 8.2 K/UL    Lymphocytes Absolute 2.9 0.5 - 4.6 K/UL    Monocytes Absolute 0.6 0.1 - 1.3 K/UL    Eosinophils Absolute 0.0 0.0 - 0.8 K/UL    Basophils Absolute 0.0 0.0 - 0.2 K/UL    Immature Granulocytes Absolute 0.1 0.0 - 0.5 K/UL       No results for input(s): \"COVID19\" in the last 72 hours.    Recent Vital Data:  Patient Vitals for the past 24 hrs:   Temp Pulse Resp BP SpO2   12/19/24 0747 -- 66 -- -- 97 %   12/19/24 0704 97.9 °F (36.6 °C) 69 12 114/87 97 %   12/19/24 0327 97.9 °F (36.6 °C) 72 19 106/64 97 %   12/18/24 2103 -- -- 19 -- --   12/18/24 1940 98.2 °F (36.8 °C) 88 18 104/64 99 %   12/18/24 1511 99 °F (37.2 °C) 76 17 125/77 100 %       Oxygen Therapy  SpO2: 97 %  Pulse via Oximetry: 67 beats per minute  Pulse Oximeter Device Mode: Continuous  Pulse Oximeter Device Location: Left, Finger  O2 Device: None (Room air)  Skin Assessment: Clean, dry, & intact  O2 Flow Rate (L/min): 2 L/min  Oxygen Therapy: None (Room air)    Estimated body mass index is 30.23 kg/m² as calculated from the following:    Height as of this encounter: 1.549 m (5' 1\").    Weight as of this encounter: 72.6 kg (160 lb).    Intake/Output Summary (Last 24 hours) at 12/19/2024 1217  Last data filed at 12/19/2024 0939  Gross per 24 hour   Intake 670 ml   Output 100 ml   Net 570 ml         Physical Exam:  General:    Well nourished.  Reclining comfortably in bed.  Head:  Normocephalic, atraumatic  Eyes:  Sclerae

## 2024-12-19 NOTE — PROGRESS NOTES
Admit Date: 12/16/2024    Subjective:     Patient has no new complaints. Reports she is feeling much better this AM.    Objective:     Patient Vitals for the past 8 hrs:   BP Temp Temp src Pulse Resp SpO2   12/19/24 0747 -- -- -- 66 -- 97 %   12/19/24 0704 114/87 97.9 °F (36.6 °C) Oral 69 12 97 %     12/19 0701 - 12/19 1900  In: 470 [P.O.:460; I.V.:10]  Out: -   12/17 1901 - 12/19 0700  In: 460 [P.O.:450; I.V.:10]  Out: 725 [Urine:725]    Physical Exam:  GENERAL ASSESSMENT: alert, oriented to person, place and time, no acute distress and no anxiety, depression or agitation  Chest: Easy work of breathing  CVS exam: RRR  ABDOMEN: not done  Neurological exam reveals alert, oriented, normal speech, no focal findings or movement disorder noted.  FEMALE GENITOURINARY EXAM: not done  MALE GENITAL EXAM: not done        Data Review   Recent Results (from the past 24 hour(s))   Basic Metabolic Panel w/ Reflex to MG    Collection Time: 12/19/24  5:31 AM   Result Value Ref Range    Sodium 139 136 - 145 mmol/L    Potassium 4.3 3.5 - 5.1 mmol/L    Chloride 107 98 - 107 mmol/L    CO2 22 20 - 29 mmol/L    Anion Gap 10 7 - 16 mmol/L    Glucose 109 (H) 70 - 99 mg/dL    BUN 22 6 - 23 MG/DL    Creatinine 0.87 0.60 - 1.10 MG/DL    Est, Glom Filt Rate 88 >60 ml/min/1.73m2    Calcium 8.7 (L) 8.8 - 10.2 MG/DL   CBC with Auto Differential    Collection Time: 12/19/24  5:31 AM   Result Value Ref Range    WBC 10.9 4.3 - 11.1 K/uL    RBC 4.46 4.05 - 5.2 M/uL    Hemoglobin 13.1 11.7 - 15.4 g/dL    Hematocrit 39.4 35.8 - 46.3 %    MCV 88.3 82 - 102 FL    MCH 29.4 26.1 - 32.9 PG    MCHC 33.2 31.4 - 35.0 g/dL    RDW 13.0 11.9 - 14.6 %    Platelets 355 150 - 450 K/uL    MPV 8.8 (L) 9.4 - 12.3 FL    nRBC 0.00 0.0 - 0.2 K/uL    Differential Type AUTOMATED      Neutrophils % 66 43 - 78 %    Lymphocytes % 27 13 - 44 %    Monocytes % 6 4.0 - 12.0 %    Eosinophils % 0 (L) 0.5 - 7.8 %    Basophils % 0 0.0 - 2.0 %    Immature Granulocytes % 1 0.0 - 5.0 %

## 2024-12-19 NOTE — PLAN OF CARE
Problem: Discharge Planning  Goal: Discharge to home or other facility with appropriate resources  12/19/2024 0101 by Farheen Chamorro RN  Outcome: Progressing  12/18/2024 1806 by Destiny Solorzano RN  Outcome: Progressing     Problem: Pain  Goal: Verbalizes/displays adequate comfort level or baseline comfort level  12/19/2024 0101 by Farheen Chamorro RN  Outcome: Progressing  12/18/2024 1806 by Destiny Solorzano RN  Outcome: Progressing     Problem: Genitourinary - Adult  Goal: Absence of urinary retention  12/19/2024 0101 by Farheen Chamorro RN  Outcome: Progressing  12/18/2024 1806 by Destiny Solorzano RN  Outcome: Progressing

## 2024-12-19 NOTE — CARE COORDINATION
Chart reviewed and patient discussed in IDT rounds this AM. Patient discharging home today. No discharge needs. Patient family/ friend in the room is providing transport home.    Lyndsey WHITT, ACM  [facility]

## 2024-12-19 NOTE — PROGRESS NOTES
Patient/caregivers speak [place]  as their preferred language for their healthcare communication. For safe communication, use the [facility]  carts or call:    Senior /Navigator Renu Reyes at [phone] or   [employer] phone services for [facility] at [phone]    General phone: [phone] ( [phone])  Email: [email or web address]    Always document the use of interpreting services ('s ID number) in your clinical notes.    Our interpreters are available for team members working with limited  English proficient (LEP) patients remotely, via phone or video or in person (if needed for special cases).    When using family members to interpret, for the safety of the patient and protection of the communication of both our patient and [facility] staff the VRI or telephonic  should remain on the line to monitor that all communication is accurate and complete. The  should be instructed to notify [facility] staff immediately if there are any inaccuracies.         Thank you,        Renu LOPES  Senior /Navigator

## 2024-12-20 ENCOUNTER — TELEPHONE (OUTPATIENT)
Dept: FAMILY MEDICINE CLINIC | Facility: CLINIC | Age: 39
End: 2024-12-20

## 2024-12-27 NOTE — ADT AUTH CERT
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

## 2025-02-04 DIAGNOSIS — N20.1 RIGHT URETERAL STONE: Primary | ICD-10-CM

## 2025-02-11 DIAGNOSIS — N20.1 RIGHT URETERAL STONE: Primary | ICD-10-CM

## 2025-02-21 DIAGNOSIS — E55.9 VITAMIN D DEFICIENCY: ICD-10-CM

## 2025-02-21 DIAGNOSIS — Z00.00 LABORATORY EXAMINATION ORDERED AS PART OF A ROUTINE GENERAL MEDICAL EXAMINATION: Primary | ICD-10-CM

## 2025-02-21 DIAGNOSIS — E78.01 FAMILIAL HYPERCHOLESTEROLEMIA: ICD-10-CM

## 2025-03-12 ENCOUNTER — CLINICAL DOCUMENTATION (OUTPATIENT)
Dept: ONCOLOGY | Age: 40
End: 2025-03-12

## 2025-03-12 NOTE — PROGRESS NOTES
Attempted to contact via 3 way with .  left detailed VM asking patient if she has had her stent removed and she needs to call radiology scheduling at [phone] to have a CT scan scheduled. He also informed her that we would need to see her in the office to review the results.

## 2025-08-29 ENCOUNTER — TRANSCRIBE ORDERS (OUTPATIENT)
Dept: SCHEDULING | Age: 40
End: 2025-08-29

## 2025-08-29 DIAGNOSIS — E28.2 BILATERAL POLYCYSTIC OVARIAN SYNDROME: Primary | ICD-10-CM

## (undated) DEVICE — PAD,NON-ADHERENT,3X8,STERILE,LF,1/PK: Brand: MEDLINE

## (undated) DEVICE — Device

## (undated) DEVICE — CONTAINER,SPECIMEN,O.R.STRL,4.5OZ: Brand: MEDLINE

## (undated) DEVICE — GLOVE SURG SZ 75 L12IN FNGR THK79MIL GRN LTX FREE

## (undated) DEVICE — GARMENT,MEDLINE,DVT,INT,CALF,MED, GEN2: Brand: MEDLINE

## (undated) DEVICE — GUIDEWIRE UROLOGICAL STR 3 CM 0.038 INX150 CM DUAL-FLEX

## (undated) DEVICE — SOLUTION IRRIG 3000ML H2O STRL BAG